# Patient Record
Sex: MALE | Race: WHITE | NOT HISPANIC OR LATINO | Employment: OTHER | URBAN - METROPOLITAN AREA
[De-identification: names, ages, dates, MRNs, and addresses within clinical notes are randomized per-mention and may not be internally consistent; named-entity substitution may affect disease eponyms.]

---

## 2024-09-04 ENCOUNTER — OFFICE VISIT (OUTPATIENT)
Dept: PODIATRY | Facility: CLINIC | Age: 84
End: 2024-09-04

## 2024-09-04 VITALS
BODY MASS INDEX: 29.26 KG/M2 | WEIGHT: 159 LBS | HEIGHT: 62 IN | SYSTOLIC BLOOD PRESSURE: 130 MMHG | DIASTOLIC BLOOD PRESSURE: 72 MMHG

## 2024-09-04 DIAGNOSIS — B35.1 ONYCHOMYCOSIS: Primary | ICD-10-CM

## 2024-09-04 PROCEDURE — NCFTCARE PR NON-COVERED FOOT CARE: Performed by: PODIATRIST

## 2024-09-04 NOTE — PROGRESS NOTES
PATIENT:  Emily Gama  1940       ASSESSMENT:     1. Onychomycosis                  PLAN:  All mycotic toenails were reduced and debrided in length, width, and girth using a nail nipper and dremel.  Patient tolerated procedure(s) well without complications.  Instructed proper foot care.  RA in 10 weeks.      Subjective:     HPI  The patient presents with chief complaint of thick/ deformed toenails.  No acute pedal problems.    The following portions of the patient's history were reviewed and updated as appropriate: allergies, current medications, past family history, past medical history, past social history, past surgical history and problem list.  All pertinent labs and images were reviewed.      Past Medical History  Past Medical History:   Diagnosis Date    Arthritis     Last assessed: 10/9/13    Atrial fibrillation (HCC)     Cancer (HCC)     squamous cell of the face    Chronic gastric ulcer without hemorrhage and without perforation 04/20/2020    Chronic kidney disease     Colon polyps     Coronary artery disease     Gastroparesis     Glaucoma     Hyperlipidemia     Hypertension     Ischemic colitis (HCC) 11/2013    Macular degeneration     Pacemaker        Past Surgical History  Past Surgical History:   Procedure Laterality Date    BREAST BIOPSY      biopsy    CARDIAC ELECTROPHYSIOLOGY PROCEDURE N/A 05/17/2022    Procedure: Cardiac eps/aflutter ablation;  Surgeon: Carl Perez MD;  [facility];  Service: Cardiology    CARDIAC ELECTROPHYSIOLOGY PROCEDURE N/A 05/17/2022    Procedure: CARDIAC PACER IMPLANT;  Surgeon: Carl Perez MD;  [facility];  Service: Cardiology    CARDIAC ELECTROPHYSIOLOGY PROCEDURE Left 05/18/2022    Procedure: Cardiac lead revision;  Surgeon: Carl Perez MD;  [facility];  Service: Cardiology    COLONOSCOPY  06/13/2013    COLONOSCOPY      June 2013 sigmoid diverticulosis and internal  hemorrhoids.  Previous colonoscopy is with adenomatous polyps.    EGD  02/11/2013    EYE SURGERY Bilateral     cataract surgery    TONSILLECTOMY      UPPER GASTROINTESTINAL ENDOSCOPY      March 2021 hiatal hernia, gastric ulcer healed, minimal gastritis.  March 2020 irregular Z-line, hiatal hernia, gastric ulcer.  Biopsies negative for H pylori or malignancy.  Biopsies negative for Leonard's.  February 2013 esophagus normal, gastritis and duodenitis, biopsies negative for H pylori.    WISDOM TOOTH EXTRACTION          Allergies:  Patient has no known allergies.    Medications:  Current Outpatient Medications   Medication Sig Dispense Refill    Cholecalciferol (VITAMIN D) 2000 units CAPS Take by mouth 1 daily      Eliquis 2.5 MG TAKE ONE TABLET BY MOUTH TWICE A  tablet 3    enalapril (VASOTEC) 10 mg tablet TAKE 1 TABLET BY MOUTH TWO TIMES A  tablet 1    flecainide (TAMBOCOR) 50 mg tablet Take 1 tablet (50 mg total) by mouth 2 (two) times a day 180 tablet 3    latanoprost (XALATAN) 0.005 % ophthalmic solution 1 drop both eyes at bedtime      metoprolol succinate (TOPROL-XL) 25 mg 24 hr tablet Take 1 tablet (25 mg total) by mouth daily 90 tablet 3    Multiple Vitamins-Minerals (PRESERVISION AREDS 2) CAPS Take by mouth 2 (two) times a day      simvastatin (ZOCOR) 10 mg tablet TAKE 1 TABLET BY MOUTH AT BEDTIME 30 tablet 4     No current facility-administered medications for this visit.       Social History:  Social History     Socioeconomic History    Marital status:      Spouse name: None    Number of children: None    Years of education: None    Highest education level: None   Occupational History    None   Tobacco Use    Smoking status: Never    Smokeless tobacco: Never   Vaping Use    Vaping status: Never Used   Substance and Sexual Activity    Alcohol use: Not Currently     Comment: Social    Drug use: No    Sexual activity: Never   Other Topics Concern    None   Social History Narrative     Active advance directive    Exercise habits---walks and does occasional stepper at home    Living alone    Living situation: supportive and safe    Sees dentist occasionally     Social Determinants of Health     Financial Resource Strain: Low Risk  (12/12/2023)    Overall Financial Resource Strain (CARDIA)     Difficulty of Paying Living Expenses: Not hard at all   Food Insecurity: No Food Insecurity (5/1/2024)    Hunger Vital Sign     Worried About Running Out of Food in the Last Year: Never true     Ran Out of Food in the Last Year: Never true   Transportation Needs: No Transportation Needs (5/1/2024)    PRAPARE - Transportation     Lack of Transportation (Medical): No     Lack of Transportation (Non-Medical): No   Physical Activity: Insufficiently Active (2/10/2021)    Exercise Vital Sign     Days of Exercise per Week: 3 days     Minutes of Exercise per Session: 30 min   Stress: Stress Concern Present (2/10/2021)    [employer] - Occupational Stress Questionnaire     Feeling of Stress : To some extent   Social Connections: Not on file   Intimate Partner Violence: Not At Risk (2/10/2021)    Humiliation, Afraid, Rape, and Kick questionnaire     Fear of Current or Ex-Partner: No     Emotionally Abused: No     Physically Abused: No     Sexually Abused: No   Housing Stability: Unknown (5/1/2024)    Housing Stability Vital Sign     Unable to Pay for Housing in the Last Year: No     Number of Times Moved in the Last Year: Not on file     Homeless in the Last Year: No          Review of Systems   Constitutional:  Negative for chills and fever.   HENT:  Negative for sore throat.    Respiratory:  Negative for cough and shortness of breath.    Cardiovascular:  Negative for chest pain.   Gastrointestinal:  Negative for diarrhea, nausea and vomiting.   Neurological:  Negative for weakness and numbness.         Objective:      /72 (BP Location: Left arm, Patient Position: Sitting, Cuff Size:  "Adult)   Ht 5' 2\" (1.575 m) Comment: stated  Wt 72.1 kg (159 lb)   LMP  (LMP Unknown)   BMI 29.08 kg/m²          Physical Exam  Vitals reviewed.   Constitutional:       General: He is not in acute distress.     Appearance: Normal appearance. He is well-developed. He is not ill-appearing.   Cardiovascular:      Rate and Rhythm: Normal rate and regular rhythm.      Pulses: Normal pulses.   Pulmonary:      Effort: Pulmonary effort is normal. No respiratory distress.   Musculoskeletal:         General: Deformity present. No tenderness or signs of injury. Normal range of motion.      Comments: Mild bunion noted.   Skin:     General: Skin is warm.      Capillary Refill: Capillary refill takes less than 2 seconds.      Coloration: Skin is not cyanotic or mottled.      Findings: No ecchymosis, erythema, rash or wound. Rash is not purpuric.      Nails: There is no clubbing.      Comments: Hypertrophy and yellow discoloration of toenails X 6.     Neurological:      General: No focal deficit present.      Mental Status: He is alert and oriented to person, place, and time.      Cranial Nerves: No cranial nerve deficit.      Sensory: No sensory deficit.   Psychiatric:         Mood and Affect: Mood normal.         Behavior: Behavior normal.         "

## 2024-10-01 ENCOUNTER — OFFICE VISIT (OUTPATIENT)
Dept: GASTROENTEROLOGY | Facility: CLINIC | Age: 84
End: 2024-10-01
Payer: COMMERCIAL

## 2024-10-01 VITALS
DIASTOLIC BLOOD PRESSURE: 78 MMHG | SYSTOLIC BLOOD PRESSURE: 120 MMHG | WEIGHT: 158 LBS | HEIGHT: 62 IN | BODY MASS INDEX: 29.08 KG/M2

## 2024-10-01 DIAGNOSIS — R13.10 DYSPHAGIA, UNSPECIFIED TYPE: ICD-10-CM

## 2024-10-01 DIAGNOSIS — K59.00 CONSTIPATION, UNSPECIFIED CONSTIPATION TYPE: ICD-10-CM

## 2024-10-01 DIAGNOSIS — K21.9 GASTROESOPHAGEAL REFLUX DISEASE WITHOUT ESOPHAGITIS: Primary | ICD-10-CM

## 2024-10-01 PROCEDURE — 99213 OFFICE O/P EST LOW 20 MIN: CPT | Performed by: INTERNAL MEDICINE

## 2024-10-01 NOTE — LETTER
October 1, 2024     Shasha Villatoro DO  [address] [address]    Patient: Emily Gama   YOB: 1940   Date of Visit: 10/1/2024       Dear Dr. Villatoro:    Thank you for referring Emily Gama to me for evaluation. Below are my notes for this consultation.    If you have questions, please do not hesitate to call me. I look forward to following your patient along with you.         Sincerely,        Makayla Camarena MD        CC: No Recipients    Makayla Camarena MD  10/1/2024 12:37 PM  Sign when Signing Visit  [facility] - Outpatient Follow-up Note  Emily Gama 83 y.o. adult MRN: [id number]  Encounter: [id number]    ASSESSMENT AND PLAN:      1. Gastroesophageal reflux disease without esophagitis  2. Dysphagia, unspecified type  GERD clinically stable with dietary management.  No alarm symptoms.  No dysphagia.  No acid suppression requirements.  Continue renal/reflux diet and precautions  OTC antacids if needed    3. Constipation, unspecified constipation type  Dietary management has also been effective for chronic constipation.  No constipation diarrhea melena or hematochezia.  Trying to eat more fruits and vegetables via her recent dietary changes due to renal disease.  Continue to encourage dietary fiber      Followup Appointment: 1 year or sooner if needed  ______________________________________________________________________    Chief Complaint   Patient presents with   • Follow-up     1 year     HPI:  GERD stable.  No dysphagia.  Still able to control by dietary management.  No nighttime meals.  Has not needed famotidine, occasional TUMS.  Tolerates camomile tea at night.    Stools generally good.  Occasional constipation.  Not needing Miralax.  Cut out fiber supplements also.  Eating more dietary fiber and avoiding meats.      Historical Information  Past Medical History:   Diagnosis Date   • Arthritis     Last assessed: 10/9/13   • Atrial  fibrillation (HCC)    • Cancer (HCC)     squamous cell of the face   • Chronic gastric ulcer without hemorrhage and without perforation 04/20/2020   • Chronic kidney disease    • Colon polyps    • Coronary artery disease    • Gastroparesis    • Glaucoma    • Hyperlipidemia    • Hypertension    • Ischemic colitis (HCC) 11/2013   • Macular degeneration    • Pacemaker      Past Surgical History:   Procedure Laterality Date   • BREAST BIOPSY      biopsy   • CARDIAC ELECTROPHYSIOLOGY PROCEDURE N/A 05/17/2022    Procedure: Cardiac eps/aflutter ablation;  Surgeon: Carl Perez MD;  [facility];  Service: Cardiology   • CARDIAC ELECTROPHYSIOLOGY PROCEDURE N/A 05/17/2022    Procedure: CARDIAC PACER IMPLANT;  Surgeon: Carl Perez MD;  [facility];  Service: Cardiology   • CARDIAC ELECTROPHYSIOLOGY PROCEDURE Left 05/18/2022    Procedure: Cardiac lead revision;  Surgeon: Carl Perez MD;  [facility];  Service: Cardiology   • COLONOSCOPY  06/13/2013   • COLONOSCOPY      June 2013 sigmoid diverticulosis and internal hemorrhoids.  Previous colonoscopy is with adenomatous polyps.   • EGD  02/11/2013   • EYE SURGERY Bilateral     cataract surgery   • TONSILLECTOMY     • UPPER GASTROINTESTINAL ENDOSCOPY      March 2021 hiatal hernia, gastric ulcer healed, minimal gastritis.  March 2020 irregular Z-line, hiatal hernia, gastric ulcer.  Biopsies negative for H pylori or malignancy.  Biopsies negative for Leonard's.  February 2013 esophagus normal, gastritis and duodenitis, biopsies negative for H pylori.   • WISDOM TOOTH EXTRACTION       Social History     Substance and Sexual Activity   Alcohol Use Not Currently    Comment: Social     Social History     Substance and Sexual Activity   Drug Use No     Social History     Tobacco Use   Smoking Status Never   Smokeless Tobacco Never     Family History   Problem Relation Age of Onset   • Hypertension Mother    • Stroke  "Mother    • Kidney disease Father    • Hypertension Father    • Cancer Father    • Stomach cancer Father    • Dialysis Father    • Single kidney Father    • Substance Abuse Neg Hx    • Mental illness Neg Hx    • Colon polyps Neg Hx    • Colon cancer Neg Hx          Current Outpatient Medications:   •  Cholecalciferol (VITAMIN D) 2000 units CAPS  •  Eliquis 2.5 MG  •  enalapril (VASOTEC) 10 mg tablet  •  flecainide (TAMBOCOR) 50 mg tablet  •  latanoprost (XALATAN) 0.005 % ophthalmic solution  •  metoprolol succinate (TOPROL-XL) 25 mg 24 hr tablet  •  Multiple Vitamins-Minerals (PRESERVISION AREDS 2) CAPS  •  simvastatin (ZOCOR) 10 mg tablet  No Known Allergies  Reviewed medications and allergies and updated as indicated    PHYSICAL EXAM:    Blood pressure 120/78, height 5' 2\" (1.575 m), weight 71.7 kg (158 lb). Body mass index is 28.9 kg/m².  General Appearance: NAD, cooperative, alert  Eyes: Anicteric, conjunctiva pink  ENT:  Normocephalic, atraumatic, normal mucosa.    Neck:  Supple, symmetrical, trachea midline  Resp:  Clear to auscultation bilaterally; no rales, rhonchi or wheezing; respirations unlabored   CV:  S1 S2, Regular rate and rhythm; no murmur, rub, or gallop.  GI:  Soft, non-tender, non-distended; normal bowel sounds; no masses, no organomegaly   Rectal: Deferred  Musculoskeletal: No cyanosis, clubbing or edema. Normal ROM.  Skin:  No jaundice, rashes, or lesions   Heme/Lymph: No palpable cervical lymphadenopathy  Psych: Normal affect, good eye contact  Neuro: No gross deficits, AAOx3    Lab Results:   Lab Results   Component Value Date    WBC 5.11 04/18/2024    HGB 9.2 (L) 04/18/2024    HCT 30.1 (L) 04/18/2024    MCV 92 04/18/2024     04/18/2024     Lab Results   Component Value Date     11/30/2015    K 4.9 04/24/2024     (H) 04/24/2024    CO2 26 04/24/2024    ANIONGAP 8 11/30/2015    BUN 26 (H) 04/24/2024    CREATININE 1.62 (H) 04/24/2024    GLUCOSE 86 11/30/2015    GLUF 85 " 04/18/2024    CALCIUM 9.0 04/24/2024    CORRECTEDCA 10.0 07/29/2021    AST 19 12/08/2023    ALT 12 12/08/2023    ALKPHOS 115 (H) 12/08/2023    PROT 7.8 10/14/2015    BILITOT 0.45 10/14/2015    EGFR 28 04/24/2024       Radiology Results:   No results found.

## 2024-10-01 NOTE — PROGRESS NOTES
[facility] - Outpatient Follow-up Note  Emily Gama 83 y.o. adult MRN: [id number]  Encounter: [id number]    ASSESSMENT AND PLAN:      1. Gastroesophageal reflux disease without esophagitis  2. Dysphagia, unspecified type  GERD clinically stable with dietary management.  No alarm symptoms.  No dysphagia.  No acid suppression requirements.  Continue renal/reflux diet and precautions  OTC antacids if needed    3. Constipation, unspecified constipation type  Dietary management has also been effective for chronic constipation.  No constipation diarrhea melena or hematochezia.  Trying to eat more fruits and vegetables via her recent dietary changes due to renal disease.  Continue to encourage dietary fiber      Followup Appointment: 1 year or sooner if needed  ______________________________________________________________________    Chief Complaint   Patient presents with    Follow-up     1 year     HPI:  GERD stable.  No dysphagia.  Still able to control by dietary management.  No nighttime meals.  Has not needed famotidine, occasional TUMS.  Tolerates camomile tea at night.    Stools generally good.  Occasional constipation.  Not needing Miralax.  Cut out fiber supplements also.  Eating more dietary fiber and avoiding meats.      Historical Information   Past Medical History:   Diagnosis Date    Arthritis     Last assessed: 10/9/13    Atrial fibrillation (HCC)     Cancer (HCC)     squamous cell of the face    Chronic gastric ulcer without hemorrhage and without perforation 04/20/2020    Chronic kidney disease     Colon polyps     Coronary artery disease     Gastroparesis     Glaucoma     Hyperlipidemia     Hypertension     Ischemic colitis (HCC) 11/2013    Macular degeneration     Pacemaker      Past Surgical History:   Procedure Laterality Date    BREAST BIOPSY      biopsy    CARDIAC ELECTROPHYSIOLOGY PROCEDURE N/A 05/17/2022    Procedure: Cardiac eps/aflutter ablation;  Surgeon:  Carl Perez MD;  [facility];  Service: Cardiology    CARDIAC ELECTROPHYSIOLOGY PROCEDURE N/A 05/17/2022    Procedure: CARDIAC PACER IMPLANT;  Surgeon: Carl Perez MD;  [facility];  Service: Cardiology    CARDIAC ELECTROPHYSIOLOGY PROCEDURE Left 05/18/2022    Procedure: Cardiac lead revision;  Surgeon: Carl Perez MD;  [facility];  Service: Cardiology    COLONOSCOPY  06/13/2013    COLONOSCOPY      June 2013 sigmoid diverticulosis and internal hemorrhoids.  Previous colonoscopy is with adenomatous polyps.    EGD  02/11/2013    EYE SURGERY Bilateral     cataract surgery    TONSILLECTOMY      UPPER GASTROINTESTINAL ENDOSCOPY      March 2021 hiatal hernia, gastric ulcer healed, minimal gastritis.  March 2020 irregular Z-line, hiatal hernia, gastric ulcer.  Biopsies negative for H pylori or malignancy.  Biopsies negative for Leonard's.  February 2013 esophagus normal, gastritis and duodenitis, biopsies negative for H pylori.    WISDOM TOOTH EXTRACTION       Social History     Substance and Sexual Activity   Alcohol Use Not Currently    Comment: Social     Social History     Substance and Sexual Activity   Drug Use No     Social History     Tobacco Use   Smoking Status Never   Smokeless Tobacco Never     Family History   Problem Relation Age of Onset    Hypertension Mother     Stroke Mother     Kidney disease Father     Hypertension Father     Cancer Father     Stomach cancer Father     Dialysis Father     Single kidney Father     Substance Abuse Neg Hx     Mental illness Neg Hx     Colon polyps Neg Hx     Colon cancer Neg Hx          Current Outpatient Medications:     Cholecalciferol (VITAMIN D) 2000 units CAPS    Eliquis 2.5 MG    enalapril (VASOTEC) 10 mg tablet    flecainide (TAMBOCOR) 50 mg tablet    latanoprost (XALATAN) 0.005 % ophthalmic solution    metoprolol succinate (TOPROL-XL) 25 mg 24 hr tablet    Multiple Vitamins-Minerals  "(PRESERVISION AREDS 2) CAPS    simvastatin (ZOCOR) 10 mg tablet  No Known Allergies  Reviewed medications and allergies and updated as indicated    PHYSICAL EXAM:    Blood pressure 120/78, height 5' 2\" (1.575 m), weight 71.7 kg (158 lb). Body mass index is 28.9 kg/m².  General Appearance: NAD, cooperative, alert  Eyes: Anicteric, conjunctiva pink  ENT:  Normocephalic, atraumatic, normal mucosa.    Neck:  Supple, symmetrical, trachea midline  Resp:  Clear to auscultation bilaterally; no rales, rhonchi or wheezing; respirations unlabored   CV:  S1 S2, Regular rate and rhythm; no murmur, rub, or gallop.  GI:  Soft, non-tender, non-distended; normal bowel sounds; no masses, no organomegaly   Rectal: Deferred  Musculoskeletal: No cyanosis, clubbing or edema. Normal ROM.  Skin:  No jaundice, rashes, or lesions   Heme/Lymph: No palpable cervical lymphadenopathy  Psych: Normal affect, good eye contact  Neuro: No gross deficits, AAOx3    Lab Results:   Lab Results   Component Value Date    WBC 5.11 04/18/2024    HGB 9.2 (L) 04/18/2024    HCT 30.1 (L) 04/18/2024    MCV 92 04/18/2024     04/18/2024     Lab Results   Component Value Date     11/30/2015    K 4.9 04/24/2024     (H) 04/24/2024    CO2 26 04/24/2024    ANIONGAP 8 11/30/2015    BUN 26 (H) 04/24/2024    CREATININE 1.62 (H) 04/24/2024    GLUCOSE 86 11/30/2015    GLUF 85 04/18/2024    CALCIUM 9.0 04/24/2024    CORRECTEDCA 10.0 07/29/2021    AST 19 12/08/2023    ALT 12 12/08/2023    ALKPHOS 115 (H) 12/08/2023    PROT 7.8 10/14/2015    BILITOT 0.45 10/14/2015    EGFR 28 04/24/2024       Radiology Results:   No results found.    "

## 2024-10-16 ENCOUNTER — REMOTE DEVICE CLINIC VISIT (OUTPATIENT)
Dept: CARDIOLOGY CLINIC | Facility: CLINIC | Age: 84
End: 2024-10-16
Payer: COMMERCIAL

## 2024-10-16 DIAGNOSIS — Z95.0 PRESENCE OF CARDIAC PACEMAKER: Primary | ICD-10-CM

## 2024-10-16 PROCEDURE — 93296 REM INTERROG EVL PM/IDS: CPT | Performed by: INTERNAL MEDICINE

## 2024-10-16 PROCEDURE — 93294 REM INTERROG EVL PM/LDLS PM: CPT | Performed by: INTERNAL MEDICINE

## 2024-10-16 NOTE — PROGRESS NOTES
Results for orders placed or performed in visit on 10/16/24   Cardiac EP device report    Narrative    MDT DC PM / ACTIVE SYSTEM IS MRI CONDITIONAL  CARELINK TRANSMISSION: BATTERY VOLTAGE ADEQUATE (11 YR). AP 99.7%  <0.1% (AAIR-DDDR 70 PPM). ALL AVAILABLE LEAD PARAMETERS WITHIN NORMAL LIMITS. NO SIGNIFICANT HIGH RATE EPISODES. NORMAL DEVICE FUNCTION.  ES

## 2024-10-26 DIAGNOSIS — I48.3 TYPICAL ATRIAL FLUTTER (HCC): ICD-10-CM

## 2024-10-28 ENCOUNTER — APPOINTMENT (OUTPATIENT)
Dept: LAB | Facility: HOSPITAL | Age: 84
End: 2024-10-28
Payer: COMMERCIAL

## 2024-10-28 DIAGNOSIS — D64.9 ANEMIA, UNSPECIFIED TYPE: ICD-10-CM

## 2024-10-28 DIAGNOSIS — N18.4 CHRONIC RENAL DISEASE, STAGE IV (HCC): ICD-10-CM

## 2024-10-28 LAB
ANION GAP SERPL CALCULATED.3IONS-SCNC: 7 MMOL/L (ref 4–13)
BACTERIA UR QL AUTO: ABNORMAL /HPF
BASOPHILS # BLD AUTO: 0.04 THOUSANDS/ΜL (ref 0–0.1)
BASOPHILS NFR BLD AUTO: 1 % (ref 0–1)
BILIRUB UR QL STRIP: NEGATIVE
BUN SERPL-MCNC: 31 MG/DL (ref 5–25)
CALCIUM SERPL-MCNC: 9.5 MG/DL (ref 8.4–10.2)
CHLORIDE SERPL-SCNC: 110 MMOL/L (ref 96–108)
CLARITY UR: CLEAR
CO2 SERPL-SCNC: 25 MMOL/L (ref 21–32)
COLOR UR: ABNORMAL
CREAT SERPL-MCNC: 1.56 MG/DL (ref 0.6–1.3)
CREAT UR-MCNC: 95.8 MG/DL
CREAT UR-MCNC: 95.8 MG/DL
EOSINOPHIL # BLD AUTO: 0.31 THOUSAND/ΜL (ref 0–0.61)
EOSINOPHIL NFR BLD AUTO: 5 % (ref 0–6)
ERYTHROCYTE [DISTWIDTH] IN BLOOD BY AUTOMATED COUNT: 14.3 % (ref 11.6–15.1)
FERRITIN SERPL-MCNC: 34 NG/ML (ref 24–307)
GLUCOSE P FAST SERPL-MCNC: 88 MG/DL (ref 65–99)
GLUCOSE UR STRIP-MCNC: NEGATIVE MG/DL
HCT VFR BLD AUTO: 33 % (ref 36.5–46.1)
HGB BLD-MCNC: 10.6 G/DL (ref 12–15.4)
HGB UR QL STRIP.AUTO: NEGATIVE
IMM GRANULOCYTES # BLD AUTO: 0.02 THOUSAND/UL (ref 0–0.2)
IMM GRANULOCYTES NFR BLD AUTO: 0 % (ref 0–2)
IRON SATN MFR SERPL: 31 % (ref 15–50)
IRON SERPL-MCNC: 102 UG/DL (ref 50–212)
KETONES UR STRIP-MCNC: NEGATIVE MG/DL
LEUKOCYTE ESTERASE UR QL STRIP: ABNORMAL
LYMPHOCYTES # BLD AUTO: 1.82 THOUSANDS/ΜL (ref 0.6–4.47)
LYMPHOCYTES NFR BLD AUTO: 26 % (ref 14–44)
MCH RBC QN AUTO: 30.3 PG (ref 26.8–34.3)
MCHC RBC AUTO-ENTMCNC: 32.1 G/DL (ref 31.4–37.4)
MCV RBC AUTO: 94 FL (ref 82–98)
MICROALBUMIN UR-MCNC: 36.4 MG/L
MICROALBUMIN/CREAT 24H UR: 38 MG/G CREATININE (ref 0–30)
MONOCYTES # BLD AUTO: 0.44 THOUSAND/ΜL (ref 0.17–1.22)
MONOCYTES NFR BLD AUTO: 6 % (ref 4–12)
NEUTROPHILS # BLD AUTO: 4.27 THOUSANDS/ΜL (ref 1.85–7.62)
NEUTS SEG NFR BLD AUTO: 62 % (ref 43–75)
NITRITE UR QL STRIP: NEGATIVE
NON-SQ EPI CELLS URNS QL MICRO: ABNORMAL /HPF
NRBC BLD AUTO-RTO: 0 /100 WBCS
PH UR STRIP.AUTO: 5.5 [PH]
PLATELET # BLD AUTO: 192 THOUSANDS/UL (ref 149–390)
PMV BLD AUTO: 11.2 FL (ref 8.9–12.7)
POTASSIUM SERPL-SCNC: 5 MMOL/L (ref 3.5–5.3)
PROT UR STRIP-MCNC: ABNORMAL MG/DL
PROT UR-MCNC: 14.6 MG/DL
PROT/CREAT UR: 0.2 MG/G{CREAT} (ref 0–0.1)
RBC # BLD AUTO: 3.5 MILLION/UL (ref 3.88–5.12)
RBC #/AREA URNS AUTO: ABNORMAL /HPF
SODIUM SERPL-SCNC: 142 MMOL/L (ref 135–147)
SP GR UR STRIP.AUTO: 1.01 (ref 1–1.03)
TIBC SERPL-MCNC: 325 UG/DL (ref 250–450)
UIBC SERPL-MCNC: 223 UG/DL (ref 155–355)
UROBILINOGEN UR STRIP-ACNC: <2 MG/DL
WBC # BLD AUTO: 6.9 THOUSAND/UL (ref 4.31–10.16)
WBC #/AREA URNS AUTO: ABNORMAL /HPF

## 2024-10-28 PROCEDURE — 83540 ASSAY OF IRON: CPT

## 2024-10-28 PROCEDURE — 85025 COMPLETE CBC W/AUTO DIFF WBC: CPT

## 2024-10-28 PROCEDURE — 84156 ASSAY OF PROTEIN URINE: CPT

## 2024-10-28 PROCEDURE — 82043 UR ALBUMIN QUANTITATIVE: CPT

## 2024-10-28 PROCEDURE — 82570 ASSAY OF URINE CREATININE: CPT

## 2024-10-28 PROCEDURE — 36415 COLL VENOUS BLD VENIPUNCTURE: CPT

## 2024-10-28 PROCEDURE — 83550 IRON BINDING TEST: CPT

## 2024-10-28 PROCEDURE — 82728 ASSAY OF FERRITIN: CPT

## 2024-10-28 PROCEDURE — 80048 BASIC METABOLIC PNL TOTAL CA: CPT

## 2024-10-28 PROCEDURE — 81001 URINALYSIS AUTO W/SCOPE: CPT

## 2024-10-28 NOTE — TELEPHONE ENCOUNTER
Requested medication(s) are due for refill today: Yes  Patient has already received a courtesy refill: No  Other reason request has been forwarded to provider: Tho f/u in [place]

## 2024-10-29 RX ORDER — APIXABAN 2.5 MG/1
2.5 TABLET, FILM COATED ORAL 2 TIMES DAILY
Qty: 180 TABLET | Refills: 2 | Status: SHIPPED | OUTPATIENT
Start: 2024-10-29

## 2024-11-05 ENCOUNTER — OFFICE VISIT (OUTPATIENT)
Dept: NEPHROLOGY | Facility: HOSPITAL | Age: 84
End: 2024-11-05
Payer: COMMERCIAL

## 2024-11-05 VITALS
SYSTOLIC BLOOD PRESSURE: 132 MMHG | DIASTOLIC BLOOD PRESSURE: 80 MMHG | HEIGHT: 62 IN | BODY MASS INDEX: 29.44 KG/M2 | WEIGHT: 160 LBS

## 2024-11-05 DIAGNOSIS — N18.4 ANEMIA DUE TO STAGE 4 CHRONIC KIDNEY DISEASE  (HCC): ICD-10-CM

## 2024-11-05 DIAGNOSIS — N18.4 CHRONIC RENAL DISEASE, STAGE IV (HCC): Primary | ICD-10-CM

## 2024-11-05 DIAGNOSIS — E55.9 VITAMIN D DEFICIENCY: ICD-10-CM

## 2024-11-05 DIAGNOSIS — I10 PRIMARY HYPERTENSION: ICD-10-CM

## 2024-11-05 DIAGNOSIS — D63.1 ANEMIA DUE TO STAGE 4 CHRONIC KIDNEY DISEASE  (HCC): ICD-10-CM

## 2024-11-05 DIAGNOSIS — N20.0 NEPHROLITHIASIS: ICD-10-CM

## 2024-11-05 PROCEDURE — 99214 OFFICE O/P EST MOD 30 MIN: CPT | Performed by: INTERNAL MEDICINE

## 2024-11-05 NOTE — ASSESSMENT & PLAN NOTE
-blood pressure acceptable in light of patient's age and history of CKD  -avoid high sodium/salt diet  -avoid caffeine  -continue enalapril 10mg twice daily, flecainide 50mg twice daily, metoprolol 25mg daily  -goal BP < 150/90, at goal

## 2024-11-05 NOTE — ASSESSMENT & PLAN NOTE
- noted on renal u/s performed March 2023  -recommend increased hydration to urinate 2-2.5L/day  -avoid high salt/sodium diet

## 2024-11-05 NOTE — ASSESSMENT & PLAN NOTE
-Chronic kidney disease stage IV in the setting of age-related nephron loss plus or minus hypertensive nephrosclerosis  - Baseline serum creatinine 1.3-1.4 up until early 2015, baseline has risen into the mid to high ones with most recent baseline 1.6-1.8, last serum creatinine 1.56 as of 10/28/24.  This correlates with an EGFR of 25-35 L/min. Renal function remains stable.  -Renal u/s March 2023 showed b/l cortical thinning and b/l renal cysts  -last UA with trace protein, microalb:Cr 38mg/g, UpCr 0.2g(improving)  -would avoid excessive vitamin C intake as this can lead to oxalate deposition in the setting of CKD. Avoid > 200mg vitamin C/day.  -avoid nonsteroidals including ibuprofen, aleve, advil, motrin, naproxen, celebrex, indomethacin, toradol  -avoid herbal teas containing dandelion root or licorice root or rosehips  -CKD education booklet provided in office previously  -would not want dialysis if needed due to family experiences  -low risk of kidney failure at 1 years and moderate risk of kidney failure at 4 years

## 2024-11-05 NOTE — ASSESSMENT & PLAN NOTE
- Hgb 10.6 as of 10/28/24, low but improved  -prior iron panel showed low ferritin and low iron sat in Aug. 2020, iron 55, TIBC 315.   -Monitor CBC.  -Repeat iron panel acceptable as of 10/28/24  -r/o myeloma(SPEP/UPEP/FLC)

## 2024-11-05 NOTE — PROGRESS NOTES
NEPHROLOGY OUTPATIENT PROGRESS NOTE   Emily Gama 83 y.o. adult MRN: [id number]  DATE: 11/5/2024  Reason for visit:   Chief Complaint   Patient presents with    Chronic Kidney Disease    Follow-up        Patient Instructions   Chronic renal disease, stage IV (HCC)  -Chronic kidney disease stage IV in the setting of age-related nephron loss plus or minus hypertensive nephrosclerosis  - Baseline serum creatinine 1.3-1.4 up until early 2015, baseline has risen into the mid to high ones with most recent baseline 1.6-1.8, last serum creatinine 1.56 as of 10/28/24.  This correlates with an EGFR of 25-35 L/min. Renal function remains stable.  -Renal u/s March 2023 showed b/l cortical thinning and b/l renal cysts  -last UA with trace protein, microalb:Cr 38mg/g, UpCr 0.2g(improving)  -would avoid excessive vitamin C intake as this can lead to oxalate deposition in the setting of CKD. Avoid > 200mg vitamin C/day.  -avoid nonsteroidals including ibuprofen, aleve, advil, motrin, naproxen, celebrex, indomethacin, toradol  -avoid herbal teas containing dandelion root or licorice root or rosehips  -CKD education booklet provided in office previously  -would not want dialysis if needed due to family experiences  -low risk of kidney failure at 2 years and moderate risk of kidney failure at 5 years  Primary hypertension  -blood pressure acceptable in light of patient's age and history of CKD  -avoid high sodium/salt diet  -avoid caffeine  -continue enalapril 10mg twice daily, flecainide 50mg twice daily, metoprolol 25mg daily  -goal BP < 150/90, at goal  Vitamin D deficiency  - continue 2000u daily vitamin D, last level 29.6 as of Dec. 2023, slightly low  Anemia due to stage 4 chronic kidney disease  (HCC)  - Hgb 10.6 as of 10/28/24, low but improved  -prior iron panel showed low ferritin and low iron sat in Aug. 2020, iron 55, TIBC 315.   -Monitor CBC.  -Repeat iron panel acceptable as of 10/28/24  -r/o myeloma(SPEP/UPEP/FLC)      Nephrolithiasis   - noted on renal u/s performed March 2023  -recommend increased hydration to urinate 2-2.5L/day  -avoid high salt/sodium diet     RTC in 6 months. Obtain blood, urine testing prior to next office visit in late Jan. 2025.      Emily was seen today for chronic kidney disease and follow-up.    Diagnoses and all orders for this visit:    Chronic renal disease, stage IV (HCC)  -     Basic metabolic panel; Future  -     Urinalysis with microscopic; Future  -     Protein / creatinine ratio, urine; Future  -     Albumin / creatinine urine ratio; Future  -     Protein electrophoresis, serum; Future  -     Protein electrophoresis, urine; Future  -     Immunoglobulin free LT chains blood; Future    Primary hypertension    Vitamin D deficiency    Anemia due to stage 4 chronic kidney disease  (HCC)  -     Protein / creatinine ratio, urine; Future  -     Albumin / creatinine urine ratio; Future  -     Protein electrophoresis, serum; Future  -     Protein electrophoresis, urine; Future  -     Immunoglobulin free LT chains blood; Future    Nephrolithiasis          Assessment & Plan  Chronic renal disease, stage IV (HCC)  -Chronic kidney disease stage IV in the setting of age-related nephron loss plus or minus hypertensive nephrosclerosis  - Baseline serum creatinine 1.3-1.4 up until early 2015, baseline has risen into the mid to high ones with most recent baseline 1.6-1.8, last serum creatinine 1.56 as of 10/28/24.  This correlates with an EGFR of 25-35 L/min. Renal function remains stable.  -Renal u/s March 2023 showed b/l cortical thinning and b/l renal cysts  -last UA with trace protein, microalb:Cr 38mg/g, UpCr 0.2g(improving)  -would avoid excessive vitamin C intake as this can lead to oxalate deposition in the setting of CKD. Avoid > 200mg vitamin C/day.  -avoid nonsteroidals including ibuprofen, aleve, advil, motrin, naproxen, celebrex, indomethacin, toradol  -avoid herbal teas containing dandelion root  or licorice root or rosehips  -CKD education booklet provided in office previously  -would not want dialysis if needed due to family experiences  -low risk of kidney failure at 2 years and moderate risk of kidney failure at 5 years  Primary hypertension  -blood pressure acceptable in light of patient's age and history of CKD  -avoid high sodium/salt diet  -avoid caffeine  -continue enalapril 10mg twice daily, flecainide 50mg twice daily, metoprolol 25mg daily  -goal BP < 150/90, at goal  Vitamin D deficiency  - continue 2000u daily vitamin D, last level 29.6 as of Dec. 2023, slightly low  Anemia due to stage 4 chronic kidney disease  (HCC)  - Hgb 10.6 as of 10/28/24, low but improved  -prior iron panel showed low ferritin and low iron sat in Aug. 2020, iron 55, TIBC 315.   -Monitor CBC.  -Repeat iron panel acceptable as of 10/28/24  -r/o myeloma(SPEP/UPEP/FLC)     Nephrolithiasis   - noted on renal u/s performed March 2023  -recommend increased hydration to urinate 2-2.5L/day  -avoid high salt/sodium diet     RTC in 6 months. Obtain blood, urine testing prior to next office visit in late Jan. 2025.    SUBJECTIVE / INTERVAL HISTORY:  83 y.o. adult presents in follow up of CKD.     Emily ALEXANDER Erenmichelle denies any recent illness/hospitalizations/medication changes since last office visit. Did go to the ER for inability to swallow.     Denies NSAID use.  HTN - BP at home has been labile.   [place] recent kidney stones.    Review of Systems   Constitutional:  Negative for chills and fever.   HENT:  Negative for sore throat.    Eyes:  Negative for visual disturbance.   Respiratory:  Negative for cough and shortness of breath.    Cardiovascular:  Negative for chest pain and leg swelling.   Gastrointestinal:  Negative for abdominal pain, constipation, diarrhea, nausea and vomiting.   Endocrine: Negative for polyuria.   Genitourinary:  Negative for decreased urine volume, difficulty urinating, dysuria and hematuria.  "  Musculoskeletal:  Negative for back pain and myalgias.   Skin:  Negative for rash.   Neurological:  Positive for weakness. Negative for dizziness, light-headedness and numbness.   Psychiatric/Behavioral:  Negative for confusion.        OBJECTIVE:  /80 (BP Location: Left arm, Patient Position: Sitting, Cuff Size: Standard)   Ht 5' 2\" (1.575 m)   Wt 72.6 kg (160 lb)   LMP  (LMP Unknown)   BMI 29.26 kg/m²  Body mass index is 29.26 kg/m².    Physical exam:  Physical Exam  Vitals reviewed.   Constitutional:       General: He is not in acute distress.     Appearance: Normal appearance. He is well-developed. He is not diaphoretic.   HENT:      Head: Normocephalic and atraumatic.      Nose: Nose normal.      Mouth/Throat:      Mouth: Mucous membranes are dry.      Pharynx: No oropharyngeal exudate.   Eyes:      General: No scleral icterus.        Right eye: No discharge.         Left eye: No discharge.   Neck:      Thyroid: No thyromegaly.   Cardiovascular:      Rate and Rhythm: Normal rate and regular rhythm.      Heart sounds: Normal heart sounds.   Pulmonary:      Effort: Pulmonary effort is normal.      Breath sounds: Normal breath sounds. No wheezing or rales.   Abdominal:      General: Bowel sounds are normal. There is no distension.      Palpations: Abdomen is soft.      Tenderness: There is no abdominal tenderness.   Musculoskeletal:         General: No swelling. Normal range of motion.      Cervical back: Neck supple.   Lymphadenopathy:      Cervical: No cervical adenopathy.   Skin:     General: Skin is warm and dry.      Coloration: Skin is not jaundiced.      Findings: No rash.   Neurological:      General: No focal deficit present.      Mental Status: He is alert.      Comments: awake   Psychiatric:         Mood and Affect: Mood normal.         Behavior: Behavior normal.         Medications:    Current Outpatient Medications:     Cholecalciferol (VITAMIN D) 2000 units CAPS, Take by mouth 1 daily, " "Disp: , Rfl:     Eliquis 2.5 MG, TAKE ONE TABLET BY MOUTH TWICE A DAY, Disp: 180 tablet, Rfl: 2    enalapril (VASOTEC) 10 mg tablet, TAKE 1 TABLET BY MOUTH TWO TIMES A DAY, Disp: 180 tablet, Rfl: 1    flecainide (TAMBOCOR) 50 mg tablet, Take 1 tablet (50 mg total) by mouth 2 (two) times a day, Disp: 180 tablet, Rfl: 3    latanoprost (XALATAN) 0.005 % ophthalmic solution, 1 drop both eyes at bedtime, Disp: , Rfl:     metoprolol succinate (TOPROL-XL) 25 mg 24 hr tablet, Take 1 tablet (25 mg total) by mouth daily, Disp: 90 tablet, Rfl: 3    simvastatin (ZOCOR) 10 mg tablet, TAKE 1 TABLET BY MOUTH AT BEDTIME, Disp: 30 tablet, Rfl: 4    Multiple Vitamins-Minerals (PRESERVISION AREDS 2) CAPS, Take by mouth 2 (two) times a day (Patient not taking: Reported on 11/5/2024), Disp: , Rfl:     Allergies:  Allergies as of 11/05/2024    (No Known Allergies)       The following portions of the patient's history were reviewed and updated as appropriate: past family history, past surgical history and problem list.    Laboratory Results:  Lab Results   Component Value Date    SODIUM 142 10/28/2024    K 5.0 10/28/2024     (H) 10/28/2024    CO2 25 10/28/2024    BUN 31 (H) 10/28/2024    CREATININE 1.56 (H) 10/28/2024    GLUC 84 04/24/2024    CALCIUM 9.5 10/28/2024        Lab Results   Component Value Date    CALCIUM 9.5 10/28/2024       Portions of the record may have been created with voice recognition software.  Occasional wrong word or \"sound a like\" substitutions may have occurred due to the inherent limitations of voice recognition software.  Read the chart carefully and recognize, using context, where substitutions have occurred.  "

## 2024-11-05 NOTE — PATIENT INSTRUCTIONS
Chronic renal disease, stage IV (HCC)  -Chronic kidney disease stage IV in the setting of age-related nephron loss plus or minus hypertensive nephrosclerosis  - Baseline serum creatinine 1.3-1.4 up until early 2015, baseline has risen into the mid to high ones with most recent baseline 1.6-1.8, last serum creatinine 1.56 as of 10/28/24.  This correlates with an EGFR of 25-35 L/min. Renal function remains stable.  -Renal u/s March 2023 showed b/l cortical thinning and b/l renal cysts  -last UA with trace protein, microalb:Cr 38mg/g, UpCr 0.2g(improving)  -would avoid excessive vitamin C intake as this can lead to oxalate deposition in the setting of CKD. Avoid > 200mg vitamin C/day.  -avoid nonsteroidals including ibuprofen, aleve, advil, motrin, naproxen, celebrex, indomethacin, toradol  -avoid herbal teas containing dandelion root or licorice root or rosehips  -CKD education booklet provided in office previously  -would not want dialysis if needed due to family experiences  -low risk of kidney failure at 2 years and moderate risk of kidney failure at 5 years  Primary hypertension  -blood pressure acceptable in light of patient's age and history of CKD  -avoid high sodium/salt diet  -avoid caffeine  -continue enalapril 10mg twice daily, flecainide 50mg twice daily, metoprolol 25mg daily  -goal BP < 150/90, at goal  Vitamin D deficiency  - continue 2000u daily vitamin D, last level 29.6 as of Dec. 2023, slightly low  Anemia due to stage 4 chronic kidney disease  (HCC)  - Hgb 10.6 as of 10/28/24, low but improved  -prior iron panel showed low ferritin and low iron sat in Aug. 2020, iron 55, TIBC 315.   -Monitor CBC.  -Repeat iron panel acceptable as of 10/28/24  -r/o myeloma(SPEP/UPEP/FLC)     Nephrolithiasis   - noted on renal u/s performed March 2023  -recommend increased hydration to urinate 2-2.5L/day  -avoid high salt/sodium diet     RTC in 6 months. Obtain blood, urine testing prior to next office visit in late  Jan. 2025.

## 2024-11-13 ENCOUNTER — OFFICE VISIT (OUTPATIENT)
Dept: PODIATRY | Facility: CLINIC | Age: 84
End: 2024-11-13

## 2024-11-13 VITALS
HEART RATE: 88 BPM | DIASTOLIC BLOOD PRESSURE: 83 MMHG | BODY MASS INDEX: 29.44 KG/M2 | HEIGHT: 62 IN | WEIGHT: 160 LBS | SYSTOLIC BLOOD PRESSURE: 148 MMHG

## 2024-11-13 DIAGNOSIS — B35.1 ONYCHOMYCOSIS: Primary | ICD-10-CM

## 2024-11-13 PROCEDURE — NCFTCARE PR NON-COVERED FOOT CARE: Performed by: PODIATRIST

## 2024-11-13 NOTE — PROGRESS NOTES
PATIENT:  Emily Gama  1940    ASSESSMENT/PLAN:  1. Onychomycosis               The patient was educated in proper foot wear.  Also discussed daily foot assessment and proper foot care.  The patient will follow up in 10 weeks.      PROCEDURE:  All mycotic toenails were reduced and debrided in length, width, and girth using a nail nipper and dremel.  Patient tolerated procedure(s) well without complications.        HPI:  Emily Gama is a 83 y.o.year old adult seen for non-covered foot care.   The patient denied any acute pedal disorder.          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    Chronic gastric ulcer without hemorrhage and without perforation 04/20/2020    Chronic kidney disease     Colon polyps     Coronary artery disease     Gastroparesis     Glaucoma     Hyperlipidemia     Hypertension     Ischemic colitis (HCC) 11/2013    Macular degeneration     Pacemaker        PAST SURGICAL HISTORY:  Past Surgical History:   Procedure Laterality Date    BREAST BIOPSY      biopsy    CARDIAC ELECTROPHYSIOLOGY PROCEDURE N/A 05/17/2022    Procedure: Cardiac eps/aflutter ablation;  Surgeon: Carl Perez MD;  Location: [facility];  Service: Cardiology    CARDIAC ELECTROPHYSIOLOGY PROCEDURE N/A 05/17/2022    Procedure: CARDIAC PACER IMPLANT;  Surgeon: Carl Perez MD;  Location: [facility];  Service: Cardiology    CARDIAC ELECTROPHYSIOLOGY PROCEDURE Left 05/18/2022    Procedure: Cardiac lead revision;  Surgeon: Carl Perez MD;  Location: [facility];  Service: Cardiology    COLONOSCOPY  06/13/2013    COLONOSCOPY      June 2013 sigmoid diverticulosis and internal hemorrhoids.  Previous colonoscopy is with adenomatous polyps.    EGD  02/11/2013    EYE SURGERY Bilateral     cataract surgery    TONSILLECTOMY      UPPER  GASTROINTESTINAL ENDOSCOPY      March 2021 hiatal hernia, gastric ulcer healed, minimal gastritis.  March 2020 irregular Z-line, hiatal hernia, gastric ulcer.  Biopsies negative for H pylori or malignancy.  Biopsies negative for Leonard's.  February 2013 esophagus normal, gastritis and duodenitis, biopsies negative for H pylori.    WISDOM TOOTH EXTRACTION          ALLERGIES:  Patient has no known allergies.    MEDICATIONS:  Current Outpatient Medications   Medication Sig Dispense Refill    Cholecalciferol (VITAMIN D) 2000 units CAPS Take by mouth 1 daily      Eliquis 2.5 MG TAKE ONE TABLET BY MOUTH TWICE A  tablet 2    enalapril (VASOTEC) 10 mg tablet TAKE 1 TABLET BY MOUTH TWO TIMES A  tablet 1    flecainide (TAMBOCOR) 50 mg tablet Take 1 tablet (50 mg total) by mouth 2 (two) times a day 180 tablet 3    latanoprost (XALATAN) 0.005 % ophthalmic solution 1 drop both eyes at bedtime      metoprolol succinate (TOPROL-XL) 25 mg 24 hr tablet Take 1 tablet (25 mg total) by mouth daily 90 tablet 3    simvastatin (ZOCOR) 10 mg tablet TAKE 1 TABLET BY MOUTH AT BEDTIME 30 tablet 4    Multiple Vitamins-Minerals (PRESERVISION AREDS 2) CAPS Take by mouth 2 (two) times a day (Patient not taking: Reported on 11/5/2024)       No current facility-administered medications for this visit.       SOCIAL HISTORY:  Social History     Socioeconomic History    Marital status:      Spouse name: None    Number of children: None    Years of education: None    Highest education level: None   Occupational History    None   Tobacco Use    Smoking status: Never    Smokeless tobacco: Never   Vaping Use    Vaping status: Never Used   Substance and Sexual Activity    Alcohol use: Yes     Comment: Social    Drug use: No    Sexual activity: Never   Other Topics Concern    None   Social History Narrative    Active advance directive    Exercise habits---walks and does occasional stepper at home    Living alone    Living situation:  "supportive and safe    Sees dentist occasionally     Social Drivers of Health     Financial Resource Strain: Low Risk  (12/12/2023)    Overall Financial Resource Strain (CARDIA)     Difficulty of Paying Living Expenses: Not hard at all   Food Insecurity: No Food Insecurity (5/1/2024)    Nursing - Inadequate Food Risk Classification     Worried About Running Out of Food in the Last Year: Never true     Ran Out of Food in the Last Year: Never true     Ran Out of Food in the Last Year: Not on file   Transportation Needs: No Transportation Needs (5/1/2024)    PRAPARE - Transportation     Lack of Transportation (Medical): No     Lack of Transportation (Non-Medical): No   Physical Activity: Insufficiently Active (2/10/2021)    Exercise Vital Sign     Days of Exercise per Week: 3 days     Minutes of Exercise per Session: 30 min   Stress: Stress Concern Present (2/10/2021)    [employer] [place] [employer] - Occupational Stress Questionnaire     Feeling of Stress : To some extent   Social Connections: Not on file   Intimate Partner Violence: Not At Risk (2/10/2021)    Humiliation, Afraid, Rape, and Kick questionnaire     Fear of Current or Ex-Partner: No     Emotionally Abused: No     Physically Abused: No     Sexually Abused: No   Housing Stability: Unknown (5/1/2024)    Housing Stability Vital Sign     Unable to Pay for Housing in the Last Year: No     Number of Times Moved in the Last Year: Not on file     Homeless in the Last Year: No        REVIEW OF SYSTEMS:  GENERAL: No fever or chills  HEART: No chest pain, or palpitation  RESPIRATORY:  No acute SOB or cough  GI: No Nausea, vomit or diarrhea  NEUROLOGIC: No syncope or acute weakness    PHYSICAL EXAM:    /83 (Patient Position: Sitting, Cuff Size: Standard)   Pulse 88   Ht 5' 2\" (1.575 m)   Wt 72.6 kg (160 lb)   LMP  (LMP Unknown)   BMI 29.26 kg/m²     VASCULAR EXAM  Pedal pulses are intact.  CRT is WNL.     NEUROLOGIC EXAM  Sensation is intact to " light touch.  No focal neurologic deficit.          DERMATOLOGIC EXAM:   No ulcer or cellulitis noted.  The patient has hypertrophic toenails X 6 with discoloration, onycholysis, and subungal debris.      MUSCULOSKELETAL EXAM:   No acute joint pain, edema, or redness.  No acute musculoskeletal problem.

## 2024-11-24 DIAGNOSIS — I48.0 PAROXYSMAL ATRIAL FIBRILLATION (HCC): ICD-10-CM

## 2024-11-26 ENCOUNTER — APPOINTMENT (OUTPATIENT)
Dept: LAB | Facility: HOSPITAL | Age: 84
End: 2024-11-26
Payer: COMMERCIAL

## 2024-11-26 DIAGNOSIS — N18.4 CHRONIC RENAL DISEASE, STAGE IV (HCC): ICD-10-CM

## 2024-11-26 LAB
ANION GAP SERPL CALCULATED.3IONS-SCNC: 7 MMOL/L (ref 4–13)
BUN SERPL-MCNC: 30 MG/DL (ref 5–25)
CALCIUM SERPL-MCNC: 9.8 MG/DL (ref 8.4–10.2)
CHLORIDE SERPL-SCNC: 109 MMOL/L (ref 96–108)
CO2 SERPL-SCNC: 25 MMOL/L (ref 21–32)
CREAT SERPL-MCNC: 1.6 MG/DL (ref 0.6–1.3)
GLUCOSE P FAST SERPL-MCNC: 80 MG/DL (ref 65–99)
POTASSIUM SERPL-SCNC: 4.8 MMOL/L (ref 3.5–5.3)
SODIUM SERPL-SCNC: 141 MMOL/L (ref 135–147)

## 2024-11-26 PROCEDURE — 80048 BASIC METABOLIC PNL TOTAL CA: CPT

## 2024-11-26 PROCEDURE — 36415 COLL VENOUS BLD VENIPUNCTURE: CPT

## 2024-11-26 RX ORDER — METOPROLOL SUCCINATE 25 MG/1
25 TABLET, EXTENDED RELEASE ORAL DAILY
Qty: 90 TABLET | Refills: 1 | Status: SHIPPED | OUTPATIENT
Start: 2024-11-26

## 2024-11-26 NOTE — TELEPHONE ENCOUNTER
Pt is scheduled for a year f/u as requested by Dr. Martínez at [facility] ON 8/6/24, when told to Return in about 1 year (around 8/6/2025). Please refill at this time

## 2024-12-03 ENCOUNTER — OFFICE VISIT (OUTPATIENT)
Dept: FAMILY MEDICINE CLINIC | Facility: HOSPITAL | Age: 84
End: 2024-12-03
Payer: COMMERCIAL

## 2024-12-03 VITALS
SYSTOLIC BLOOD PRESSURE: 142 MMHG | OXYGEN SATURATION: 99 % | DIASTOLIC BLOOD PRESSURE: 80 MMHG | BODY MASS INDEX: 29.63 KG/M2 | HEART RATE: 97 BPM | HEIGHT: 62 IN | WEIGHT: 161 LBS

## 2024-12-03 DIAGNOSIS — E55.9 VITAMIN D DEFICIENCY: ICD-10-CM

## 2024-12-03 DIAGNOSIS — I48.0 PAROXYSMAL ATRIAL FIBRILLATION (HCC): ICD-10-CM

## 2024-12-03 DIAGNOSIS — I10 PRIMARY HYPERTENSION: Primary | ICD-10-CM

## 2024-12-03 DIAGNOSIS — N18.4 CHRONIC RENAL DISEASE, STAGE IV (HCC): ICD-10-CM

## 2024-12-03 PROCEDURE — 99214 OFFICE O/P EST MOD 30 MIN: CPT | Performed by: INTERNAL MEDICINE

## 2024-12-03 NOTE — PROGRESS NOTES
Assessment/Plan:     Diagnosis ICD-10-CM Associated Orders   1. Primary hypertension  I10 Lipid Panel with Direct LDL reflex     Comprehensive metabolic panel     CBC and differential      2. Chronic renal disease, stage IV (HCC)  N18.4 Lipid Panel with Direct LDL reflex     Comprehensive metabolic panel     CBC and differential      3. Vitamin D deficiency  E55.9 Vitamin D 25 hydroxy          Problem List Items Addressed This Visit          Cardiovascular and Mediastinum    Hypertension - Primary    Home readings are ok- slightly elevated today  Will have her check weekly at home and call if elevated  Metoprolol 25 daily and enalopril 10 bid         Relevant Orders    Lipid Panel with Direct LDL reflex    Comprehensive metabolic panel    CBC and differential       Genitourinary    Chronic renal disease, stage IV (HCC)    Relevant Orders    Lipid Panel with Direct LDL reflex    Comprehensive metabolic panel    CBC and differential       Other    Vitamin D deficiency    Relevant Orders    Vitamin D 25 hydroxy         Return in about 6 months (around 6/3/2025) for Recheck.      Subjective:    Patient ID: Emily Gama is a 84 y.o. adult    Here for  follow up- seen by dr Peters recently with nephrology- avoids nsaids ans discussed hydration   Was in [facility] and will be in [place] for [place]        The following portions of the patient's history were reviewed and updated as appropriate: allergies, current medications and problem list.     Review of Systems   Constitutional:  Negative for fatigue.   HENT:  Negative for congestion.    Eyes:         Seen every 6 months with glaucoma and macular degeneraton   Respiratory:  Negative for shortness of breath.    Cardiovascular:  Negative for chest pain and palpitations.   Gastrointestinal:  Negative for abdominal pain, constipation and diarrhea.   All other systems reviewed and are negative.        Objective:      Current Outpatient Medications:      "Cholecalciferol (VITAMIN D) 2000 units CAPS, Take by mouth 1 daily, Disp: , Rfl:     Eliquis 2.5 MG, TAKE ONE TABLET BY MOUTH TWICE A DAY, Disp: 180 tablet, Rfl: 2    enalapril (VASOTEC) 10 mg tablet, TAKE 1 TABLET BY MOUTH TWO TIMES A DAY, Disp: 180 tablet, Rfl: 1    flecainide (TAMBOCOR) 50 mg tablet, Take 1 tablet (50 mg total) by mouth 2 (two) times a day, Disp: 180 tablet, Rfl: 3    latanoprost (XALATAN) 0.005 % ophthalmic solution, 1 drop both eyes at bedtime, Disp: , Rfl:     metoprolol succinate (TOPROL-XL) 25 mg 24 hr tablet, TAKE ONE TABLET BY MOUTH EVERY DAY, Disp: 90 tablet, Rfl: 1    Multiple Vitamins-Minerals (PRESERVISION AREDS 2) CAPS, Take by mouth 2 (two) times a day, Disp: , Rfl:     simvastatin (ZOCOR) 10 mg tablet, TAKE 1 TABLET BY MOUTH AT BEDTIME, Disp: 30 tablet, Rfl: 4    Blood pressure 142/80, pulse 97, height 5' 2\" (1.575 m), weight 73 kg (161 lb), SpO2 99%.     Physical Exam  Vitals and nursing note reviewed.   HENT:      Head: Normocephalic.      Right Ear: Tympanic membrane normal. There is no impacted cerumen.      Left Ear: Tympanic membrane normal. There is no impacted cerumen.      Nose: No congestion.      Mouth/Throat:      Pharynx: No posterior oropharyngeal erythema.   Eyes:      General:         Right eye: No discharge.         Left eye: No discharge.   Cardiovascular:      Rate and Rhythm: Normal rate and regular rhythm.      Heart sounds: No murmur heard.  Pulmonary:      Breath sounds: No wheezing or rhonchi.   Abdominal:      Palpations: Abdomen is soft.      Tenderness: There is no abdominal tenderness. There is no guarding.   Musculoskeletal:         General: No tenderness.      Cervical back: No rigidity or tenderness.   Skin:     Findings: No erythema.   Neurological:      Mental Status: He is alert.      Motor: No weakness.   Psychiatric:         Thought Content: Thought content normal.         Judgment: Judgment normal.        "

## 2024-12-03 NOTE — ASSESSMENT & PLAN NOTE
Home readings are ok- slightly elevated today  Will have her check weekly at home and call if elevated  Metoprolol 25 daily and enalopril 10 bid

## 2024-12-30 DIAGNOSIS — E78.5 HYPERLIPIDEMIA, UNSPECIFIED HYPERLIPIDEMIA TYPE: ICD-10-CM

## 2024-12-30 DIAGNOSIS — I48.0 PAROXYSMAL ATRIAL FIBRILLATION (HCC): ICD-10-CM

## 2024-12-31 RX ORDER — SIMVASTATIN 10 MG
10 TABLET ORAL
Qty: 30 TABLET | Refills: 0 | Status: SHIPPED | OUTPATIENT
Start: 2024-12-31

## 2024-12-31 RX ORDER — FLECAINIDE ACETATE 50 MG/1
50 TABLET ORAL 2 TIMES DAILY
Qty: 180 TABLET | Refills: 3 | Status: SHIPPED | OUTPATIENT
Start: 2024-12-31

## 2025-01-22 ENCOUNTER — OFFICE VISIT (OUTPATIENT)
Dept: PODIATRY | Facility: CLINIC | Age: 85
End: 2025-01-22

## 2025-01-22 VITALS — HEIGHT: 62 IN | BODY MASS INDEX: 29.26 KG/M2 | WEIGHT: 159 LBS

## 2025-01-22 DIAGNOSIS — B35.1 ONYCHOMYCOSIS: Primary | ICD-10-CM

## 2025-01-22 PROCEDURE — NCFTCARE PR NON-COVERED FOOT CARE: Performed by: PODIATRIST

## 2025-01-22 NOTE — PROGRESS NOTES
PATIENT:  Emily Gama  1940    ASSESSMENT/PLAN:  1. Onychomycosis               The patient was educated in proper foot wear.  Also discussed daily foot assessment and proper foot care.  The patient will follow up in 10 weeks.      PROCEDURE:  All mycotic toenails were reduced and debrided in length, width, and girth using a nail nipper and dremel.  Patient tolerated procedure(s) well without complications.        HPI:  Emily Gama is a 84 y.o.year old adult seen for non-covered foot care.   The patient denied any acute pedal disorder.          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    Chronic gastric ulcer without hemorrhage and without perforation 04/20/2020    Chronic kidney disease     Colon polyps     Coronary artery disease     Gastroparesis     Glaucoma     Hyperlipidemia     Hypertension     Ischemic colitis (HCC) 11/2013    Macular degeneration     Pacemaker        PAST SURGICAL HISTORY:  Past Surgical History:   Procedure Laterality Date    BREAST BIOPSY      biopsy    CARDIAC ELECTROPHYSIOLOGY PROCEDURE N/A 05/17/2022    Procedure: Cardiac eps/aflutter ablation;  Surgeon: Carl Perez MD;  [facility];  Service: Cardiology    CARDIAC ELECTROPHYSIOLOGY PROCEDURE N/A 05/17/2022    Procedure: CARDIAC PACER IMPLANT;  Surgeon: Carl Perez MD;  [facility];  Service: Cardiology    CARDIAC ELECTROPHYSIOLOGY PROCEDURE Left 05/18/2022    Procedure: Cardiac lead revision;  Surgeon: Carl Perez MD;  [facility]: [facility];  Service: Cardiology    COLONOSCOPY  06/13/2013    COLONOSCOPY      June 2013 sigmoid diverticulosis and internal hemorrhoids.  Previous colonoscopy is with adenomatous polyps.    EGD  02/11/2013    EYE SURGERY Bilateral     cataract surgery    TONSILLECTOMY      UPPER  GASTROINTESTINAL ENDOSCOPY      March 2021 hiatal hernia, gastric ulcer healed, minimal gastritis.  March 2020 irregular Z-line, hiatal hernia, gastric ulcer.  Biopsies negative for H pylori or malignancy.  Biopsies negative for Leonard's.  February 2013 esophagus normal, gastritis and duodenitis, biopsies negative for H pylori.    WISDOM TOOTH EXTRACTION          ALLERGIES:  Patient has no known allergies.    MEDICATIONS:  Current Outpatient Medications   Medication Sig Dispense Refill    Cholecalciferol (VITAMIN D) 2000 units CAPS Take by mouth 1 daily      Eliquis 2.5 MG TAKE ONE TABLET BY MOUTH TWICE A  tablet 2    enalapril (VASOTEC) 10 mg tablet TAKE 1 TABLET BY MOUTH TWO TIMES A  tablet 1    flecainide (TAMBOCOR) 50 mg tablet TAKE ONE TABLET BY MOUTH TWICE A  tablet 3    latanoprost (XALATAN) 0.005 % ophthalmic solution 1 drop both eyes at bedtime      metoprolol succinate (TOPROL-XL) 25 mg 24 hr tablet TAKE ONE TABLET BY MOUTH EVERY DAY 90 tablet 1    Multiple Vitamins-Minerals (PRESERVISION AREDS 2) CAPS Take by mouth 2 (two) times a day      simvastatin (ZOCOR) 10 mg tablet TAKE ONE TABLET BY MOUTH DAILY AT BEDTIME 30 tablet 0     No current facility-administered medications for this visit.       SOCIAL HISTORY:  Social History     Socioeconomic History    Marital status:      Spouse name: None    Number of children: None    Years of education: None    Highest education level: None   Occupational History    None   Tobacco Use    Smoking status: Never    Smokeless tobacco: Never   Vaping Use    Vaping status: Never Used   Substance and Sexual Activity    Alcohol use: Yes     Comment: Social    Drug use: No    Sexual activity: Never   Other Topics Concern    None   Social History Narrative    Active advance directive    Exercise habits---walks and does occasional stepper at home    Living alone    Living situation: supportive and safe    Sees dentist occasionally     Social  "Drivers of Health     Financial Resource Strain: Low Risk  (12/12/2023)    Overall Financial Resource Strain (CARDIA)     Difficulty of Paying Living Expenses: Not hard at all   Food Insecurity: No Food Insecurity (5/1/2024)    Nursing - Inadequate Food Risk Classification     Worried About Running Out of Food in the Last Year: Never true     Ran Out of Food in the Last Year: Never true     Ran Out of Food in the Last Year: Not on file   Transportation Needs: No Transportation Needs (5/1/2024)    PRAPARE - Transportation     Lack of Transportation (Medical): No     Lack of Transportation (Non-Medical): No   Physical Activity: Insufficiently Active (2/10/2021)    Exercise Vital Sign     Days of Exercise per Week: 3 days     Minutes of Exercise per Session: 30 min   Stress: Stress Concern Present (2/10/2021)    [employer] - Occupational Stress Questionnaire     Feeling of Stress : To some extent   Social Connections: Not on file   Intimate Partner Violence: Not At Risk (2/10/2021)    Humiliation, Afraid, Rape, and Kick questionnaire     Fear of Current or Ex-Partner: No     Emotionally Abused: No     Physically Abused: No     Sexually Abused: No   Housing Stability: Unknown (5/1/2024)    Housing Stability Vital Sign     Unable to Pay for Housing in the Last Year: No     Number of Times Moved in the Last Year: Not on file     Homeless in the Last Year: No        REVIEW OF SYSTEMS:  GENERAL: No fever or chills  HEART: No chest pain, or palpitation  RESPIRATORY:  No acute SOB or cough  GI: No Nausea, vomit or diarrhea  NEUROLOGIC: No syncope or acute weakness    PHYSICAL EXAM:    Ht 5' 2\" (1.575 m)   Wt 72.1 kg (159 lb)   LMP  (LMP Unknown)   BMI 29.08 kg/m²     VASCULAR EXAM  Pedal pulses are intact.  CRT is WNL.     NEUROLOGIC EXAM  Sensation is intact to light touch.  No focal neurologic deficit.          DERMATOLOGIC EXAM:   No ulcer or cellulitis noted.  The patient has hypertrophic " toenails X 6 with discoloration, onycholysis, and subungal debris.      MUSCULOSKELETAL EXAM:   No acute joint pain, edema, or redness.  No acute musculoskeletal problem.

## 2025-02-20 DIAGNOSIS — I10 PRIMARY HYPERTENSION: ICD-10-CM

## 2025-02-21 RX ORDER — ENALAPRIL MALEATE 10 MG/1
10 TABLET ORAL 2 TIMES DAILY
Qty: 180 TABLET | Refills: 1 | Status: SHIPPED | OUTPATIENT
Start: 2025-02-21

## 2025-02-24 DIAGNOSIS — E78.5 HYPERLIPIDEMIA, UNSPECIFIED HYPERLIPIDEMIA TYPE: ICD-10-CM

## 2025-02-25 RX ORDER — SIMVASTATIN 10 MG
10 TABLET ORAL
Qty: 30 TABLET | Refills: 0 | Status: SHIPPED | OUTPATIENT
Start: 2025-02-25

## 2025-02-26 ENCOUNTER — RESULTS FOLLOW-UP (OUTPATIENT)
Dept: CARDIOLOGY CLINIC | Facility: CLINIC | Age: 85
End: 2025-02-26

## 2025-02-26 ENCOUNTER — IN-CLINIC DEVICE VISIT (OUTPATIENT)
Dept: CARDIOLOGY CLINIC | Facility: CLINIC | Age: 85
End: 2025-02-26
Payer: COMMERCIAL

## 2025-02-26 DIAGNOSIS — Z95.0 CARDIAC PACEMAKER IN SITU: Primary | ICD-10-CM

## 2025-02-26 PROCEDURE — 93280 PM DEVICE PROGR EVAL DUAL: CPT | Performed by: INTERNAL MEDICINE

## 2025-02-26 NOTE — PROGRESS NOTES
Results for orders placed or performed in visit on 02/26/25   Cardiac EP device report    Narrative    MDT DC PM / ACTIVE SYSTEM IS MRI CONDITIONAL  DEVICE INTERROGATED IN THE [facility] OFFICE: BATTERY VOLTAGE ADEQUATE-10 YRS. %  0%. ALL AVAILABLE LEAD PARAMETERS WITHIN NORMAL LIMITS. 2 AT/AF NOTED; 52 MINS LONG; SAME DAY; 0% BURDEN. AVAIL EGRAMS SHOWING AF. PT ON FLECAINIDE, ELIQUIS, & METO SUCC. NO PROGRAMMING CHANGES MADE TO DEVICE PARAMETERS. NORMAL DEVICE FUNCTION. NC

## 2025-03-28 DIAGNOSIS — E78.5 HYPERLIPIDEMIA, UNSPECIFIED HYPERLIPIDEMIA TYPE: ICD-10-CM

## 2025-03-28 RX ORDER — SIMVASTATIN 10 MG
10 TABLET ORAL
Qty: 30 TABLET | Refills: 0 | Status: SHIPPED | OUTPATIENT
Start: 2025-03-28

## 2025-03-31 ENCOUNTER — APPOINTMENT (EMERGENCY)
Dept: RADIOLOGY | Facility: HOSPITAL | Age: 85
End: 2025-03-31
Payer: COMMERCIAL

## 2025-03-31 ENCOUNTER — HOSPITAL ENCOUNTER (OUTPATIENT)
Facility: HOSPITAL | Age: 85
Setting detail: OBSERVATION
Discharge: HOME | End: 2025-04-01
Attending: EMERGENCY MEDICINE | Admitting: INTERNAL MEDICINE
Payer: COMMERCIAL

## 2025-03-31 ENCOUNTER — APPOINTMENT (OUTPATIENT)
Dept: CT IMAGING | Facility: HOSPITAL | Age: 85
End: 2025-03-31
Payer: COMMERCIAL

## 2025-03-31 DIAGNOSIS — R51.9 FACIAL PAIN, ACUTE: ICD-10-CM

## 2025-03-31 DIAGNOSIS — Z01.89 ENCOUNTER FOR IMAGING TO SCREEN FOR METAL PRIOR TO MRI: Primary | ICD-10-CM

## 2025-03-31 DIAGNOSIS — R52 SHARP PAIN: ICD-10-CM

## 2025-03-31 DIAGNOSIS — Z95.0 PACEMAKER: ICD-10-CM

## 2025-03-31 PROBLEM — E06.9 THYROIDITIS: Status: ACTIVE | Noted: 2025-03-31

## 2025-03-31 LAB
ANION GAP SERPL CALCULATED.3IONS-SCNC: 6 MMOL/L (ref 4–13)
BASOPHILS # BLD AUTO: 0.01 THOUSANDS/ÂΜL (ref 0–0.1)
BASOPHILS NFR BLD AUTO: 0 % (ref 0–1)
BUN SERPL-MCNC: 29 MG/DL (ref 5–25)
CALCIUM SERPL-MCNC: 9.6 MG/DL (ref 8.4–10.2)
CHLORIDE SERPL-SCNC: 110 MMOL/L (ref 96–108)
CO2 SERPL-SCNC: 22 MMOL/L (ref 21–32)
CREAT SERPL-MCNC: 1.79 MG/DL (ref 0.6–1.3)
EOSINOPHIL # BLD AUTO: 0.29 THOUSAND/ÂΜL (ref 0–0.61)
EOSINOPHIL NFR BLD AUTO: 5 % (ref 0–6)
ERYTHROCYTE [DISTWIDTH] IN BLOOD BY AUTOMATED COUNT: 13.3 % (ref 11.6–15.1)
ERYTHROCYTE [SEDIMENTATION RATE] IN BLOOD: 18 MM/HOUR (ref 0–19)
GLUCOSE SERPL-MCNC: 115 MG/DL (ref 65–140)
HCT VFR BLD AUTO: 33.7 % (ref 36.5–46.1)
HGB BLD-MCNC: 10.8 G/DL (ref 12–15.4)
IMM GRANULOCYTES # BLD AUTO: 0.03 THOUSAND/UL (ref 0–0.2)
IMM GRANULOCYTES NFR BLD AUTO: 1 % (ref 0–2)
LYMPHOCYTES # BLD AUTO: 1.49 THOUSANDS/ÂΜL (ref 0.6–4.47)
LYMPHOCYTES NFR BLD AUTO: 24 % (ref 14–44)
MCH RBC QN AUTO: 30.3 PG (ref 26.8–34.3)
MCHC RBC AUTO-ENTMCNC: 32 G/DL (ref 31.4–37.4)
MCV RBC AUTO: 94 FL (ref 82–98)
MONOCYTES # BLD AUTO: 0.34 THOUSAND/ÂΜL (ref 0.17–1.22)
MONOCYTES NFR BLD AUTO: 6 % (ref 4–12)
NEUTROPHILS # BLD AUTO: 4.05 THOUSANDS/ÂΜL (ref 1.85–7.62)
NEUTS SEG NFR BLD AUTO: 64 % (ref 43–75)
NRBC BLD AUTO-RTO: 0 /100 WBCS
PLATELET # BLD AUTO: 217 THOUSANDS/UL (ref 149–390)
PMV BLD AUTO: 11 FL (ref 8.9–12.7)
POTASSIUM SERPL-SCNC: 4.6 MMOL/L (ref 3.5–5.3)
RBC # BLD AUTO: 3.57 MILLION/UL (ref 3.88–5.12)
SODIUM SERPL-SCNC: 138 MMOL/L (ref 135–147)
TSH SERPL DL<=0.05 MIU/L-ACNC: 2.62 UIU/ML (ref 0.45–4.5)
WBC # BLD AUTO: 6.21 THOUSAND/UL (ref 4.31–10.16)

## 2025-03-31 PROCEDURE — 99222 1ST HOSP IP/OBS MODERATE 55: CPT | Performed by: INTERNAL MEDICINE

## 2025-03-31 PROCEDURE — 70486 CT MAXILLOFACIAL W/O DYE: CPT

## 2025-03-31 PROCEDURE — 80048 BASIC METABOLIC PNL TOTAL CA: CPT | Performed by: EMERGENCY MEDICINE

## 2025-03-31 PROCEDURE — 93005 ELECTROCARDIOGRAM TRACING: CPT

## 2025-03-31 PROCEDURE — 84443 ASSAY THYROID STIM HORMONE: CPT | Performed by: INTERNAL MEDICINE

## 2025-03-31 PROCEDURE — 85652 RBC SED RATE AUTOMATED: CPT | Performed by: EMERGENCY MEDICINE

## 2025-03-31 PROCEDURE — 85025 COMPLETE CBC W/AUTO DIFF WBC: CPT | Performed by: EMERGENCY MEDICINE

## 2025-03-31 PROCEDURE — 36415 COLL VENOUS BLD VENIPUNCTURE: CPT | Performed by: EMERGENCY MEDICINE

## 2025-03-31 PROCEDURE — 70490 CT SOFT TISSUE NECK W/O DYE: CPT

## 2025-03-31 PROCEDURE — 99283 EMERGENCY DEPT VISIT LOW MDM: CPT

## 2025-03-31 RX ORDER — FLECAINIDE ACETATE 100 MG/1
50 TABLET ORAL 2 TIMES DAILY
Status: DISCONTINUED | OUTPATIENT
Start: 2025-03-31 | End: 2025-04-01 | Stop reason: HOSPADM

## 2025-03-31 RX ORDER — ACETAMINOPHEN 325 MG/1
650 TABLET ORAL EVERY 6 HOURS PRN
Status: DISCONTINUED | OUTPATIENT
Start: 2025-03-31 | End: 2025-04-01 | Stop reason: HOSPADM

## 2025-03-31 RX ORDER — ONDANSETRON 2 MG/ML
4 INJECTION INTRAMUSCULAR; INTRAVENOUS EVERY 6 HOURS PRN
Status: DISCONTINUED | OUTPATIENT
Start: 2025-03-31 | End: 2025-04-01 | Stop reason: HOSPADM

## 2025-03-31 RX ORDER — LATANOPROST 50 UG/ML
1 SOLUTION/ DROPS OPHTHALMIC
Status: DISCONTINUED | OUTPATIENT
Start: 2025-03-31 | End: 2025-04-01 | Stop reason: HOSPADM

## 2025-03-31 RX ORDER — METOPROLOL SUCCINATE 25 MG/1
25 TABLET, EXTENDED RELEASE ORAL DAILY
Status: DISCONTINUED | OUTPATIENT
Start: 2025-04-01 | End: 2025-04-01 | Stop reason: HOSPADM

## 2025-03-31 RX ORDER — PRAVASTATIN SODIUM 20 MG
20 TABLET ORAL
Status: DISCONTINUED | OUTPATIENT
Start: 2025-03-31 | End: 2025-04-01 | Stop reason: HOSPADM

## 2025-03-31 RX ORDER — LISINOPRIL 20 MG/1
20 TABLET ORAL DAILY
Status: DISCONTINUED | OUTPATIENT
Start: 2025-04-01 | End: 2025-04-01 | Stop reason: HOSPADM

## 2025-03-31 RX ORDER — GABAPENTIN 100 MG/1
100 CAPSULE ORAL 3 TIMES DAILY
Status: DISCONTINUED | OUTPATIENT
Start: 2025-03-31 | End: 2025-04-01 | Stop reason: HOSPADM

## 2025-03-31 RX ADMIN — FLECAINIDE ACETATE 50 MG: 100 TABLET ORAL at 19:40

## 2025-03-31 RX ADMIN — GABAPENTIN 100 MG: 100 CAPSULE ORAL at 19:40

## 2025-03-31 RX ADMIN — LATANOPROST 1 DROP: 50 SOLUTION OPHTHALMIC at 21:24

## 2025-03-31 RX ADMIN — APIXABAN 2.5 MG: 2.5 TABLET, FILM COATED ORAL at 19:40

## 2025-03-31 RX ADMIN — PRAVASTATIN SODIUM 20 MG: 20 TABLET ORAL at 19:40

## 2025-03-31 NOTE — ASSESSMENT & PLAN NOTE
Lab Results   Component Value Date    EGFR 28 04/24/2024    EGFR 30 04/18/2024    EGFR 24 04/03/2024    CREATININE 1.79 (H) 03/31/2025    CREATININE 1.60 (H) 11/26/2024    CREATININE 1.56 (H) 10/28/2024   Follows with nephrology as outpatient  Monitor BMP  Renally dose medications  Avoid hypotension

## 2025-03-31 NOTE — ED PROVIDER NOTES
"Time reflects when diagnosis was documented in both MDM as applicable and the Disposition within this note       Time User Action Codes Description Comment    3/31/2025 11:58 AM Laith Sarkar Add [Z01.89] Encounter for imaging to screen for metal prior to MRI     3/31/2025 11:58 AM Laith Sarkar Add [Z95.0] Pacemaker           ED Disposition       None          Assessment & Plan       Medical Decision Making  84-year-old female with demise of pain left posterolateral neck that radiates towards the mastoid and at times to the left cheek.  No mechanical stimulation reproduces her symptoms.  No recent trauma or fever.  Concern for odontogenic or otologic infection, temporal arteritis, left vertebral artery dissection.  No focal neurologic changes or rash.  No signs of infection.  Discussed with radiologist.  Will order MRA of neck to rule out pathology.  Patient has had recent CTs of neck without contrast (due to CKD).  Done for odynophagia, which has improved. Result from 5/6/24: \"Near total resolution of previously noted edema and soft tissue swelling involving the retropharyngeal region including bilateral carotid spaces, posterior triangles and superior mediastinum.   There is again heterogeneous density of the thyroid with resolved surrounding infiltrative changes and edema.\"    Patient signed out to Dr. Sanders at end of my shift.  She remained stable, waiting for MRA of neck.      Amount and/or Complexity of Data Reviewed  External Data Reviewed: labs, radiology and notes.  Labs: ordered.  Radiology: ordered.        ED Course as of 03/31/25 1626   Mon Mar 31, 2025   1103 Patient says she is not allowed to have CT contrast dye.  She has CKD. I discussed case with radiology, Dr. Macias.  He agrees that she is cleared to do MRA of the neck with and without contrast to rule out vertebral artery dissection.  Order placed.  I discussed with MRI tech, who is aware.       Medications - No data to " display    ED Risk Strat Scores                            SBIRT 22yo+      Flowsheet Row Most Recent Value   Initial Alcohol Screen: US AUDIT-C     1. How often do you have a drink containing alcohol? 0 Filed at: 03/31/2025 0907   2. How many drinks containing alcohol do you have on a typical day you are drinking?  0 Filed at: 03/31/2025 0907   3a. Male UNDER 65: How often do you have five or more drinks on one occasion? 0 Filed at: 03/31/2025 0907   3b. FEMALE Any Age, or MALE 65+: How often do you have 4 or more drinks on one occassion? 0 Filed at: 03/31/2025 0907   Audit-C Score 0 Filed at: 03/31/2025 0907   DAVID: How many times in the past year have you...    Used an illegal drug or used a prescription medication for non-medical reasons? Never Filed at: 03/31/2025 0907                            History of Present Illness       Chief Complaint   Patient presents with    Pain     To ED for left sided facail pain . Denies any visual changes. States that she has had congestion and sinus pain x 10 days.       Past Medical History:   Diagnosis Date    Arthritis     Last assessed: 10/9/13    Atrial fibrillation (HCC)     Cancer (HCC)     squamous cell of the face    Chronic gastric ulcer without hemorrhage and without perforation 04/20/2020    Chronic kidney disease     Colon polyps     Coronary artery disease     Gastroparesis     Glaucoma     Hyperlipidemia     Hypertension     Ischemic colitis (HCC) 11/2013    Macular degeneration     Pacemaker       Past Surgical History:   Procedure Laterality Date    BREAST BIOPSY      biopsy    CARDIAC ELECTROPHYSIOLOGY PROCEDURE N/A 05/17/2022    Procedure: Cardiac eps/aflutter ablation;  Surgeon: Carl Perez MD;  [facility];  Service: Cardiology    CARDIAC ELECTROPHYSIOLOGY PROCEDURE N/A 05/17/2022    Procedure: CARDIAC PACER IMPLANT;  Surgeon: Carl Perez MD;  [facility];  Service: Cardiology    CARDIAC ELECTROPHYSIOLOGY  PROCEDURE Left 05/18/2022    Procedure: Cardiac lead revision;  Surgeon: Carl Perez MD;  [facility];  Service: Cardiology    COLONOSCOPY  06/13/2013    COLONOSCOPY      June 2013 sigmoid diverticulosis and internal hemorrhoids.  Previous colonoscopy is with adenomatous polyps.    EGD  02/11/2013    EYE SURGERY Bilateral     cataract surgery    TONSILLECTOMY      UPPER GASTROINTESTINAL ENDOSCOPY      March 2021 hiatal hernia, gastric ulcer healed, minimal gastritis.  March 2020 irregular Z-line, hiatal hernia, gastric ulcer.  Biopsies negative for H pylori or malignancy.  Biopsies negative for Leonard's.  February 2013 esophagus normal, gastritis and duodenitis, biopsies negative for H pylori.    WISDOM TOOTH EXTRACTION        Family History   Problem Relation Age of Onset    Hypertension Mother     Stroke Mother     Kidney disease Father     Hypertension Father     Cancer Father     Stomach cancer Father     Dialysis Father     Single kidney Father     Substance Abuse Neg Hx     Mental illness Neg Hx     Colon polyps Neg Hx     Colon cancer Neg Hx       Social History     Tobacco Use    Smoking status: Never    Smokeless tobacco: Never   Vaping Use    Vaping status: Never Used   Substance Use Topics    Alcohol use: Yes     Comment: Social    Drug use: No      E-Cigarette/Vaping    E-Cigarette Use Never User       E-Cigarette/Vaping Substances    Nicotine No     THC No     CBD No     Flavoring No     Other No     Unknown No       I have reviewed and agree with the history as documented.     84-year-old female with history of atrial fibrillation on Eliquis, CAD, chronic kidney disease, hypertension, hyperlipidemia and remote squamous cell carcinoma of the face that is in remission complains of intermittent left neck and mastoid pain since yesterday.  Today the pain intermittently radiated towards the left cheekbone as well.  Fluctuates in intensity.  No exacerbating or alleviating factor.   Does note copious yellowish/clear nasal drainage for 10 days that has improved.  Pain does not localize to teeth, ear or sinus location.  Denies other dental problems or change in hearing lately.  Denies other headache, vertigo, change in vision.  No temporal pain with mastication.  No paresthesia or facial weakness.  No change in taste. No other neck pain or radiculopathy.        Review of Systems   Constitutional:  Negative for fever.   HENT:  Positive for postnasal drip, rhinorrhea and sinus pressure. Negative for dental problem, ear discharge, hearing loss, sore throat, tinnitus, trouble swallowing and voice change.    Eyes:  Negative for visual disturbance.   Respiratory:  Negative for cough and shortness of breath.    Cardiovascular:  Negative for chest pain and palpitations.   Musculoskeletal:  Negative for neck pain and neck stiffness.   Skin:  Negative for rash and wound.   Neurological:  Negative for dizziness, seizures, syncope, facial asymmetry, speech difficulty, numbness and headaches.           Objective       ED Triage Vitals   Temperature Pulse Blood Pressure Respirations SpO2 Patient Position - Orthostatic VS   03/31/25 0909 03/31/25 0905 03/31/25 0905 03/31/25 0905 03/31/25 0905 03/31/25 0905   98.1 °F (36.7 °C) 94 (!) 178/75 20 96 % Lying      Temp Source Heart Rate Source BP Location FiO2 (%) Pain Score    03/31/25 0909 03/31/25 0905 03/31/25 0905 -- 03/31/25 0905    Oral Monitor Left arm  3      Vitals      Date and Time Temp Pulse SpO2 Resp BP Pain Score FACES Pain Rating User   03/31/25 1200 -- 70 98 % 18 147/65 -- -- ML   03/31/25 1103 -- -- -- -- -- No Pain -- ML   03/31/25 1100 -- 70 98 % 18 154/66 -- -- ML   03/31/25 1000 -- 74 98 % 18 169/72 -- -- ML   03/31/25 0909 98.1 °F (36.7 °C) -- -- -- -- -- -- LJF   03/31/25 0905 -- 94 96 % 20 178/75 3 -- LJF            Physical Exam  Vitals and nursing note reviewed.   Constitutional:       General: He is not in acute distress.     Appearance:  He is well-developed. He is not ill-appearing or diaphoretic.   HENT:      Head: Normocephalic and atraumatic.      Comments: No tenderness nor nodularity over left temporal artery.  No tenderness, warmth, erythema or edema of left parotid or submandibular glands.     Right Ear: Tympanic membrane, ear canal and external ear normal.      Left Ear: Tympanic membrane, ear canal and external ear normal.      Nose: No congestion or rhinorrhea.      Mouth/Throat:      Mouth: Mucous membranes are moist.      Pharynx: Oropharynx is clear. No oropharyngeal exudate or posterior oropharyngeal erythema.      Comments: Missing several teeth.  No dental tenderness, gingival or intraoral swelling or drainage.  Eyes:      General: No scleral icterus.     Extraocular Movements: Extraocular movements intact.      Conjunctiva/sclera: Conjunctivae normal.      Pupils: Pupils are equal, round, and reactive to light.   Neck:      Vascular: No carotid bruit.   Cardiovascular:      Rate and Rhythm: Normal rate and regular rhythm.      Pulses: Normal pulses.      Heart sounds: Normal heart sounds.   Pulmonary:      Effort: Pulmonary effort is normal. No respiratory distress.      Breath sounds: Normal breath sounds.   Abdominal:      General: Bowel sounds are normal.      Palpations: Abdomen is soft.      Tenderness: There is no abdominal tenderness.   Musculoskeletal:         General: No tenderness. Normal range of motion.      Cervical back: Normal range of motion and neck supple. No tenderness.      Right lower leg: No edema.      Left lower leg: No edema.   Lymphadenopathy:      Cervical: No cervical adenopathy.   Skin:     General: Skin is warm and dry.      Capillary Refill: Capillary refill takes less than 2 seconds.      Findings: No bruising or rash.   Neurological:      General: No focal deficit present.      Mental Status: He is alert and oriented to person, place, and time. Mental status is at baseline.      Cranial Nerves: No  cranial nerve deficit.      Sensory: No sensory deficit.      Motor: No weakness.      Coordination: Coordination normal.      Deep Tendon Reflexes: Reflexes are normal and symmetric.   Psychiatric:         Mood and Affect: Mood normal.         Behavior: Behavior normal.         Results Reviewed       Procedure Component Value Units Date/Time    Sedimentation rate, automated [[id number]]  (Normal) Collected: 03/31/25 1113    Lab Status: Final result Specimen: Blood from Arm, Left Updated: 03/31/25 1139     Sed Rate 18 mm/hour     Basic metabolic panel [[id number]]  (Abnormal) Collected: 03/31/25 1113    Lab Status: Final result Specimen: Blood from Arm, Left Updated: 03/31/25 1137     Sodium 138 mmol/L      Potassium 4.6 mmol/L      Chloride 110 mmol/L      CO2 22 mmol/L      ANION GAP 6 mmol/L      BUN 29 mg/dL      Creatinine 1.79 mg/dL      Glucose 115 mg/dL      Calcium 9.6 mg/dL      eGFR --    Narrative:      Notes:     1. eGFR calculation is only valid for adults 18 years and older.  2. EGFR calculation cannot be performed for patients who are transgender, non-binary, or whose legal sex, sex at birth, and gender identity differ.    CBC and differential [[id number]]  (Abnormal) Collected: 03/31/25 1113    Lab Status: Final result Specimen: Blood from Arm, Left Updated: 03/31/25 1120     WBC 6.21 Thousand/uL      RBC 3.57 Million/uL      Hemoglobin 10.8 g/dL      Hematocrit 33.7 %      MCV 94 fL      MCH 30.3 pg      MCHC 32.0 g/dL      RDW 13.3 %      MPV 11.0 fL      Platelets 217 Thousands/uL      nRBC 0 /100 WBCs      Segmented % 64 %      Immature Grans % 1 %      Lymphocytes % 24 %      Monocytes % 6 %      Eosinophils Relative 5 %      Basophils Relative 0 %      Absolute Neutrophils 4.05 Thousands/µL      Absolute Immature Grans 0.03 Thousand/uL      Absolute Lymphocytes 1.49 Thousands/µL      Absolute Monocytes 0.34 Thousand/µL      Eosinophils Absolute 0.29 Thousand/µL      Basophils Absolute 0.01  Thousands/µL             MRI ED order    (Results Pending)   XR follow up    (Results Pending)       Procedures    ED Medication and Procedure Management   Prior to Admission Medications   Prescriptions Last Dose Informant Patient Reported? Taking?   Cholecalciferol (VITAMIN D) 2000 units CAPS  Self Yes No   Sig: Take by mouth 1 daily   Eliquis 2.5 MG   No No   Sig: TAKE ONE TABLET BY MOUTH TWICE A DAY   Multiple Vitamins-Minerals (PRESERVISION AREDS 2) CAPS  Self Yes No   Sig: Take by mouth 2 (two) times a day   enalapril (VASOTEC) 10 mg tablet   No No   Sig: TAKE 1 TABLET BY MOUTH TWO TIMES A DAY   flecainide (TAMBOCOR) 50 mg tablet   No No   Sig: TAKE ONE TABLET BY MOUTH TWICE A DAY   latanoprost (XALATAN) 0.005 % ophthalmic solution  Self Yes No   Si drop both eyes at bedtime   metoprolol succinate (TOPROL-XL) 25 mg 24 hr tablet   No No   Sig: TAKE ONE TABLET BY MOUTH EVERY DAY   simvastatin (ZOCOR) 10 mg tablet   No No   Sig: TAKE ONE TABLET BY MOUTH DAILY AT BEDTIME      Facility-Administered Medications: None     Patient's Medications   Discharge Prescriptions    No medications on file     No discharge procedures on file.  ED SEPSIS DOCUMENTATION   Time reflects when diagnosis was documented in both MDM as applicable and the Disposition within this note       Time User Action Codes Description Comment    3/31/2025 11:58 AM Laith Sarkar [Z01.89] Encounter for imaging to screen for metal prior to MRI     3/31/2025 11:58 AM Laith Sarkar [Z95.0] Pacemaker                  Siddharth Cates DO  25 1626

## 2025-03-31 NOTE — ASSESSMENT & PLAN NOTE
Presenting with sharp shooting pain left side behind the ear  Likely trigeminal neuralgia  CT facial bones  Will consult neurology  Trial gabapentin for pain

## 2025-03-31 NOTE — H&P
H&P - Hospitalist   Name: Emily Gama 84 y.o. adult I MRN: [id number]  Unit/Bed#: [facility] I Date of Admission: 3/31/2025   Date of Service: 3/31/2025 I Hospital Day: 0     Assessment & Plan  Atrial fibrillation (HCC)  Heart rate appears to be controlled  S/P PPM  Continue home medications flecainide, metoprolol    Hyperlipidemia  On Crestor, will substitute with pravastatin  Hypertension  On enalapril, substituted with lisinopril  Chronic renal disease, stage IV (HCC)  Lab Results   Component Value Date    EGFR 28 04/24/2024    EGFR 30 04/18/2024    EGFR 24 04/03/2024    CREATININE 1.79 (H) 03/31/2025    CREATININE 1.60 (H) 11/26/2024    CREATININE 1.56 (H) 10/28/2024   Follows with nephrology as outpatient  Monitor BMP  Renally dose medications  Avoid hypotension  TMJ arthralgia  History of arthralgia noted on chart    Thyroiditis  Heterogeneous density of thyroid with infiltrative changes and edema noted on CAT scans from 5/24  Check TSH  Check CT soft tissue neck  Sharp pain  Presenting with sharp shooting pain left side behind the ear  Likely trigeminal neuralgia  CT facial bones  Will consult neurology  Trial gabapentin for pain      VTE Pharmacologic Prophylaxis:   Moderate Risk (Score 3-4) - Pharmacological DVT Prophylaxis Ordered: apixaban (Eliquis).  Code Status: Level 3 - DNAR and DNI   Discussion with family: Patient declined call to .     Anticipated Length of Stay: Patient will be admitted on an observation basis with an anticipated length of stay of less than 2 midnights secondary to MRA pending.    History of Present Illness   Chief Complaint:   Chief Complaint   Patient presents with    Pain     To ED for left sided facail pain . Denies any visual changes. States that she has had congestion and sinus pain x 10 days.        Emily Gama is a 84 y.o. adult with a PMH of A-fib, hypertension, hyperlipidemia who presents with left sided facial pain.  Pain is sharp, shooting, lasts less than a  minute.  Not able to describe frequency, no aggravating or relieving factors.  Patient stated she had a bad cold 10 days ago and noticed pain 2 to 3 days after and has been putting off to see a doctor since then.  She had excruciating pain last night that brought her to the ED this morning.  Denies fever, chills, visual changes, weakness, difficulty swallowing.    In the ED, ED provider ordered MRA.  Patient has pacemaker and MRA cannot be performed until a.m.  Patient is admitted as FOR MRA    Review of Systems    Historical Information   Past Medical History:   Diagnosis Date    Arthritis     Last assessed: 10/9/13    Atrial fibrillation (HCC)     Cancer (HCC)     squamous cell of the face    Chronic gastric ulcer without hemorrhage and without perforation 04/20/2020    Chronic kidney disease     Colon polyps     Coronary artery disease     Gastroparesis     Glaucoma     Hyperlipidemia     Hypertension     Ischemic colitis (HCC) 11/2013    Macular degeneration     Pacemaker      Past Surgical History:   Procedure Laterality Date    BREAST BIOPSY      biopsy    CARDIAC ELECTROPHYSIOLOGY PROCEDURE N/A 05/17/2022    Procedure: Cardiac eps/aflutter ablation;  Surgeon: Carl Perez MD;  [facility]: [facility];  Service: Cardiology    CARDIAC ELECTROPHYSIOLOGY PROCEDURE N/A 05/17/2022    Procedure: CARDIAC PACER IMPLANT;  Surgeon: Carl Perez MD;  Location: [facility];  Service: Cardiology    CARDIAC ELECTROPHYSIOLOGY PROCEDURE Left 05/18/2022    Procedure: Cardiac lead revision;  Surgeon: Carl Perez MD;  Location: [facility];  Service: Cardiology    COLONOSCOPY  06/13/2013    COLONOSCOPY      June 2013 sigmoid diverticulosis and internal hemorrhoids.  Previous colonoscopy is with adenomatous polyps.    EGD  02/11/2013    EYE SURGERY Bilateral     cataract surgery    TONSILLECTOMY      UPPER GASTROINTESTINAL ENDOSCOPY      March 2021 hiatal hernia, gastric ulcer healed, minimal  gastritis.  March 2020 irregular Z-line, hiatal hernia, gastric ulcer.  Biopsies negative for H pylori or malignancy.  Biopsies negative for Leonard's.  February 2013 esophagus normal, gastritis and duodenitis, biopsies negative for H pylori.    WISDOM TOOTH EXTRACTION       Social History     Tobacco Use    Smoking status: Never    Smokeless tobacco: Never   Vaping Use    Vaping status: Never Used   Substance and Sexual Activity    Alcohol use: Yes     Comment: Social    Drug use: No    Sexual activity: Never     E-Cigarette/Vaping    E-Cigarette Use Never User      E-Cigarette/Vaping Substances    Nicotine No     THC No     CBD No     Flavoring No     Other No     Unknown No      Family History   Problem Relation Age of Onset    Hypertension Mother     Stroke Mother     Kidney disease Father     Hypertension Father     Cancer Father     Stomach cancer Father     Dialysis Father     Single kidney Father     Substance Abuse Neg Hx     Mental illness Neg Hx     Colon polyps Neg Hx     Colon cancer Neg Hx      Social History:  Marital Status:    Occupation:   Patient Pre-hospital Living Situation: Home  Patient Pre-hospital Level of Mobility: walks  Patient Pre-hospital Diet Restrictions:     Meds/Allergies   I have reviewed home medications with patient personally.  Prior to Admission medications    Medication Sig Start Date End Date Taking? Authorizing Provider   Cholecalciferol (VITAMIN D) 2000 units CAPS Take by mouth 1 daily    Historical Provider, MD   Eliquis 2.5 MG TAKE ONE TABLET BY MOUTH TWICE A DAY 10/29/24   Mohit Martínez MD   enalapril (VASOTEC) 10 mg tablet TAKE 1 TABLET BY MOUTH TWO TIMES A DAY 2/21/25   Mohit Martínez MD   flecainide (TAMBOCOR) 50 mg tablet TAKE ONE TABLET BY MOUTH TWICE A DAY 12/31/24   Mohit Martínez MD   latanoprost (XALATAN) 0.005 % ophthalmic solution 1 drop both eyes at bedtime 3/10/18   Historical Provider, MD   metoprolol succinate (TOPROL-XL) 25 mg 24 hr tablet  TAKE ONE TABLET BY MOUTH EVERY DAY 11/26/24   Mohit Martínez MD   Multiple Vitamins-Minerals (PRESERVISION AREDS 2) CAPS Take by mouth 2 (two) times a day    Historical Provider, MD   simvastatin (ZOCOR) 10 mg tablet TAKE ONE TABLET BY MOUTH DAILY AT BEDTIME 3/28/25   Mohit Martínez MD     No Known Allergies    Objective :  Temp:  [98.1 °F (36.7 °C)] 98.1 °F (36.7 °C)  HR:  [70-94] 70  BP: (147-178)/(65-75) 147/65  Resp:  [18-20] 18  SpO2:  [96 %-98 %] 98 %  O2 Device: None (Room air)    Physical Exam  Vitals and nursing note reviewed.   Constitutional:       General: He is not in acute distress.     Appearance: He is not ill-appearing.   HENT:      Head: Normocephalic.      Left Ear: Ear canal and external ear normal.      Mouth/Throat:      Mouth: Mucous membranes are moist.      Pharynx: Oropharynx is clear.   Eyes:      General: No visual field deficit.        Right eye: No discharge.         Left eye: No discharge.      Extraocular Movements: Extraocular movements intact.      Conjunctiva/sclera: Conjunctivae normal.      Pupils: Pupils are equal, round, and reactive to light.   Cardiovascular:      Rate and Rhythm: Normal rate.   Pulmonary:      Effort: Pulmonary effort is normal.      Breath sounds: Normal breath sounds.   Abdominal:      General: Bowel sounds are normal.      Palpations: Abdomen is soft.   Skin:     General: Skin is warm and dry.      Capillary Refill: Capillary refill takes 2 to 3 seconds.   Neurological:      Mental Status: He is alert.      Cranial Nerves: No cranial nerve deficit, dysarthria or facial asymmetry.              Lines/Drains:            Lab Results: I have reviewed the following results:  Results from last 7 days   Lab Units 03/31/25  1113   WBC Thousand/uL 6.21   HEMOGLOBIN g/dL 10.8*   HEMATOCRIT % 33.7*   PLATELETS Thousands/uL 217   SEGS PCT % 64   LYMPHO PCT % 24   MONO PCT % 6   EOS PCT % 5     Results from last 7 days   Lab Units 03/31/25  1113   SODIUM mmol/L 138    POTASSIUM mmol/L 4.6   CHLORIDE mmol/L 110*   CO2 mmol/L 22   BUN mg/dL 29*   CREATININE mg/dL 1.79*   ANION GAP mmol/L 6   CALCIUM mg/dL 9.6   GLUCOSE RANDOM mg/dL 115             Lab Results   Component Value Date    HGBA1C 5.5 04/24/2017           Imaging Results Review: I reviewed radiology reports from this admission including: chest xray.  Other Study Results Review: EKG was reviewed.     Administrative Statements   I have spent a total time of 75 minutes in caring for this patient on the day of the visit/encounter including Diagnostic results, Impressions, Counseling / Coordination of care, Documenting in the medical record, Reviewing/placing orders in the medical record (including tests, medications, and/or procedures), Obtaining or reviewing history  , and Communicating with other healthcare professionals .    ** Please Note: This note has been constructed using a voice recognition system. **

## 2025-03-31 NOTE — ASSESSMENT & PLAN NOTE
Heterogeneous density of thyroid with infiltrative changes and edema noted on CAT scans from 5/24  Check TSH  Check CT soft tissue neck

## 2025-04-01 ENCOUNTER — TELEPHONE (OUTPATIENT)
Age: 85
End: 2025-04-01

## 2025-04-01 VITALS
OXYGEN SATURATION: 98 % | HEIGHT: 62 IN | WEIGHT: 154.98 LBS | HEART RATE: 71 BPM | DIASTOLIC BLOOD PRESSURE: 80 MMHG | RESPIRATION RATE: 16 BRPM | SYSTOLIC BLOOD PRESSURE: 142 MMHG | BODY MASS INDEX: 28.52 KG/M2 | TEMPERATURE: 97.5 F

## 2025-04-01 PROBLEM — K02.9 DENTAL CARIES: Status: ACTIVE | Noted: 2025-04-01

## 2025-04-01 PROBLEM — E87.5 HYPERKALEMIA: Status: ACTIVE | Noted: 2025-04-01

## 2025-04-01 LAB
ANION GAP SERPL CALCULATED.3IONS-SCNC: 4 MMOL/L (ref 4–13)
BASOPHILS # BLD AUTO: 0.03 THOUSANDS/ÂΜL (ref 0–0.1)
BASOPHILS NFR BLD AUTO: 1 % (ref 0–1)
BUN SERPL-MCNC: 27 MG/DL (ref 5–25)
CALCIUM SERPL-MCNC: 9.2 MG/DL (ref 8.4–10.2)
CHLORIDE SERPL-SCNC: 112 MMOL/L (ref 96–108)
CO2 SERPL-SCNC: 21 MMOL/L (ref 21–32)
CREAT SERPL-MCNC: 1.75 MG/DL (ref 0.6–1.3)
EOSINOPHIL # BLD AUTO: 0.27 THOUSAND/ÂΜL (ref 0–0.61)
EOSINOPHIL NFR BLD AUTO: 5 % (ref 0–6)
ERYTHROCYTE [DISTWIDTH] IN BLOOD BY AUTOMATED COUNT: 13.4 % (ref 11.6–15.1)
GLUCOSE SERPL-MCNC: 87 MG/DL (ref 65–140)
HCT VFR BLD AUTO: 31.4 % (ref 36.5–46.1)
HGB BLD-MCNC: 9.7 G/DL (ref 12–15.4)
IMM GRANULOCYTES # BLD AUTO: 0.01 THOUSAND/UL (ref 0–0.2)
IMM GRANULOCYTES NFR BLD AUTO: 0 % (ref 0–2)
LYMPHOCYTES # BLD AUTO: 1.94 THOUSANDS/ÂΜL (ref 0.6–4.47)
LYMPHOCYTES NFR BLD AUTO: 37 % (ref 14–44)
MCH RBC QN AUTO: 29.5 PG (ref 26.8–34.3)
MCHC RBC AUTO-ENTMCNC: 30.9 G/DL (ref 31.4–37.4)
MCV RBC AUTO: 95 FL (ref 82–98)
MONOCYTES # BLD AUTO: 0.39 THOUSAND/ÂΜL (ref 0.17–1.22)
MONOCYTES NFR BLD AUTO: 8 % (ref 4–12)
NEUTROPHILS # BLD AUTO: 2.56 THOUSANDS/ÂΜL (ref 1.85–7.62)
NEUTS SEG NFR BLD AUTO: 49 % (ref 43–75)
NRBC BLD AUTO-RTO: 0 /100 WBCS
PLATELET # BLD AUTO: 166 THOUSANDS/UL (ref 149–390)
PMV BLD AUTO: 10.6 FL (ref 8.9–12.7)
POTASSIUM SERPL-SCNC: 5.1 MMOL/L (ref 3.5–5.3)
RBC # BLD AUTO: 3.29 MILLION/UL (ref 3.88–5.12)
SODIUM SERPL-SCNC: 137 MMOL/L (ref 135–147)
WBC # BLD AUTO: 5.2 THOUSAND/UL (ref 4.31–10.16)

## 2025-04-01 PROCEDURE — 85025 COMPLETE CBC W/AUTO DIFF WBC: CPT | Performed by: INTERNAL MEDICINE

## 2025-04-01 PROCEDURE — 99239 HOSP IP/OBS DSCHRG MGMT >30: CPT | Performed by: NURSE PRACTITIONER

## 2025-04-01 PROCEDURE — 80048 BASIC METABOLIC PNL TOTAL CA: CPT | Performed by: INTERNAL MEDICINE

## 2025-04-01 RX ORDER — GABAPENTIN 100 MG/1
100 CAPSULE ORAL 3 TIMES DAILY
Qty: 90 CAPSULE | Refills: 0 | Status: SHIPPED | OUTPATIENT
Start: 2025-04-01 | End: 2025-05-01

## 2025-04-01 RX ORDER — FLUTICASONE PROPIONATE 50 MCG
1 SPRAY, SUSPENSION (ML) NASAL DAILY
Qty: 9.9 ML | Refills: 0 | Status: SHIPPED | OUTPATIENT
Start: 2025-04-01 | End: 2025-04-15

## 2025-04-01 RX ADMIN — Medication 2000 UNITS: at 09:03

## 2025-04-01 RX ADMIN — METOPROLOL SUCCINATE 25 MG: 25 TABLET, EXTENDED RELEASE ORAL at 09:04

## 2025-04-01 RX ADMIN — FLECAINIDE ACETATE 50 MG: 100 TABLET ORAL at 09:04

## 2025-04-01 RX ADMIN — LISINOPRIL 20 MG: 20 TABLET ORAL at 09:03

## 2025-04-01 RX ADMIN — GABAPENTIN 100 MG: 100 CAPSULE ORAL at 09:03

## 2025-04-01 RX ADMIN — APIXABAN 2.5 MG: 2.5 TABLET, FILM COATED ORAL at 09:04

## 2025-04-01 NOTE — ASSESSMENT & PLAN NOTE
Urgency on admission, now improved  On enalapril PTA, substituted with lisinopril  Continue metoprolol

## 2025-04-01 NOTE — ASSESSMENT & PLAN NOTE
Lab Results   Component Value Date    EGFR 28 04/24/2024    EGFR 30 04/18/2024    EGFR 24 04/03/2024    CREATININE 1.75 (H) 04/01/2025    CREATININE 1.79 (H) 03/31/2025    CREATININE 1.60 (H) 11/26/2024   Baseline appears to be around 1.6-1.8, Follows with nephrology (Dr. Peters) as outpatient  Outpatient monitoring

## 2025-04-01 NOTE — ASSESSMENT & PLAN NOTE
Heterogeneous density of thyroid with infiltrative changes and edema noted on CAT scans from 5/24  CT soft tissue neck showed Mildly heterogeneous attenuation of the thyroid gland with left lobe nodule   TSH WNL  Will need outpatient thyroid US

## 2025-04-01 NOTE — ASSESSMENT & PLAN NOTE
Heterogeneous density of thyroid with infiltrative changes and edema noted on CAT scans from 5/24  CT soft tissue neck showed Mildly heterogeneous attenuation of the thyroid gland with left lobe nodule   TSH WNL  Will need outpatient thyroid US, d/w patient.

## 2025-04-01 NOTE — UTILIZATION REVIEW
Initial Clinical Review    Admission: Date/Time/Statement: 3/31/25 1738 observation   Admission Orders (From admission, onward)       Ordered        03/31/25 1738  Place in Observation  Once                          Orders Placed This Encounter   Procedures    Place in Observation     Standing Status:   Standing     Number of Occurrences:   1     Level of Care:   Med Surg [16]     ED Arrival Information       Expected   -    Arrival   3/31/2025 08:48    Acuity   Urgent              Means of arrival   Walk-In    Escorted by   Self    Service   Hospitalist    Admission type   Emergency              Arrival complaint   left side head pain             Chief Complaint   Patient presents with    Pain     To ED for left sided facail pain . Denies any visual changes. States that she has had congestion and sinus pain x 10 days.       Initial Presentation: 84 y.o. adult  to ED via walk in from home.    Admitted to observation with Dx: Sharp Pain, likely trigeminal neuralgia.  Presented to ED with intermittent left neck and mastoid pain starting the day prior to arrival,  on day of arrival pain worsening and radiating to left cheekbone.  Had cold about 10 days ago and pain started 2 to 3 days after it.    PMHx: atrial fibrillation on Eliquis, CAD, chronic kidney disease, hypertension, hyperlipidemia and remote squamous cell carcinoma of the face . On exam: hypertensive.  Missing several teeth.  Bun 29, creatinine 1.79.  wbc 6.21. .   Imaging shows no acute findings on ct neck.   Plan includes to check ct of facial bone.   Trial of Gabapentin for pain.  Consult neurology. .     Anticipated Length of Stay/Certification Statement: Patient will be admitted on an observation basis with an anticipated length of stay of less than 2 midnights secondary to MRA pending.       ED Treatment-Medication Administration from 03/31/2025 0847 to 03/31/2025 1913       None          Scheduled Medications:  apixaban, 2.5 mg, Oral,  BID  cholecalciferol, 2,000 Units, Oral, Daily  flecainide, 50 mg, Oral, BID  gabapentin, 100 mg, Oral, TID  latanoprost, 1 drop, Both Eyes, HS  lisinopril, 20 mg, Oral, Daily  metoprolol succinate, 25 mg, Oral, Daily  pravastatin, 20 mg, Oral, Daily With Dinner      Continuous IV Infusions:     PRN Meds:  acetaminophen, 650 mg, Oral, Q6H PRN  ondansetron, 4 mg, Intravenous, Q6H PRN      ED Triage Vitals   Temperature Pulse Respirations Blood Pressure SpO2 Pain Score   03/31/25 0909 03/31/25 0905 03/31/25 0905 03/31/25 0905 03/31/25 0905 03/31/25 0905   98.1 °F (36.7 °C) 94 20 (!) 178/75 96 % 3     Weight (last 2 days)       Date/Time Weight    03/31/25 1944 70.3 (154.98)    03/31/25 0905 71.5 (157.63)            Vital Signs (last 3 days)       Date/Time Temp Pulse Resp BP MAP (mmHg) SpO2 O2 Device Patient Position - Orthostatic VS Elisa Coma Scale Score Pain    04/01/25 07:19:37 97.5 °F (36.4 °C) 71 16 142/80 101 98 % None (Room air) Lying -- --    03/31/25 20:18:23 97.9 °F (36.6 °C) 89 -- 146/83 104 96 % -- -- -- --    03/31/25 1945 -- -- -- -- -- -- -- -- 15 No Pain    03/31/25 1944 -- 74 16 -- -- -- -- -- -- --    03/31/25 19:17:12 97.7 °F (36.5 °C) -- -- 137/87 104 -- -- -- -- --    03/31/25 1200 -- 70 18 147/65 94 98 % None (Room air) Sitting -- --    03/31/25 1103 -- -- -- -- -- -- -- -- -- No Pain    03/31/25 1100 -- 70 18 154/66 95 98 % None (Room air) Sitting -- --    03/31/25 1000 -- 74 18 169/72 103 98 % None (Room air) Sitting -- --    03/31/25 0952 -- -- -- -- -- -- -- -- 15 --    03/31/25 0909 98.1 °F (36.7 °C) -- -- -- -- -- -- -- -- --    03/31/25 0905 -- 94 20 178/75 -- 96 % None (Room air) Lying -- 3              Pertinent Labs/Diagnostic Test Results:   Radiology:  CT facial bones wo contrast   Final Interpretation by Tevin Fisher MD (03/31 2218)      1.  No acute abnormality or apparent evidence of soft tissue inflammation.   2.  Dental assessment is limited due to streak artifact from prior  "dental work. Suspect carious right third mandibular molar tooth. No periapical disease.   3.  Bilateral maxillary and left ethmoidal sinus disease. No fluid level.               Workstation performed: [id number]         CT soft tissue neck wo contrast   Final Interpretation by Tevin Fisher MD (03/31 2222)      1.  Somewhat limited assessment without IV contrast. No apparent acute inflammatory process in the neck.      2.  Mildly heterogeneous attenuation of the thyroid gland with left lobe nodule, better evaluated in prior dedicated thyroid ultrasound.         Workstation performed: [id number]         XR follow up   Final Interpretation by Edmund Ross MD (04/01 0801)      No acute cardiopulmonary disease.            Workstation performed: [id number]         MRI ED order    (Results Pending)     Cardiology:  ECG 12 lead    by Interface, Ris Results In (03/31 2013)        GI:  No orders to display           Results from last 7 days   Lab Units 04/01/25  0357 03/31/25  1113   WBC Thousand/uL 5.20 6.21   HEMOGLOBIN g/dL 9.7* 10.8*   HEMATOCRIT % 31.4* 33.7*   PLATELETS Thousands/uL 166 217   TOTAL NEUT ABS Thousands/µL 2.56 4.05         Results from last 7 days   Lab Units 04/01/25  0357 03/31/25  1113   SODIUM mmol/L 137 138   POTASSIUM mmol/L 5.1 4.6   CHLORIDE mmol/L 112* 110*   CO2 mmol/L 21 22   ANION GAP mmol/L 4 6   BUN mg/dL 27* 29*   CREATININE mg/dL 1.75* 1.79*   CALCIUM mg/dL 9.2 9.6             Results from last 7 days   Lab Units 04/01/25  0357 03/31/25  1113   GLUCOSE RANDOM mg/dL 87 115             No results found for: \"BETA-HYDROXYBUTYRATE\"                               Results from last 7 days   Lab Units 03/31/25  1113   TSH 3RD GENERATON uIU/mL 2.617                                             Results from last 7 days   Lab Units 03/31/25  1113   SED RATE mm/hour 18                                                               Past Medical History:   Diagnosis Date    Arthritis     Last assessed: " 10/9/13    Atrial fibrillation (HCC)     Cancer (HCC)     squamous cell of the face    Chronic gastric ulcer without hemorrhage and without perforation 04/20/2020    Chronic kidney disease     Colon polyps     Coronary artery disease     Gastroparesis     Glaucoma     Hyperlipidemia     Hypertension     Ischemic colitis (HCC) 11/2013    Macular degeneration     Pacemaker      Present on Admission:   Atrial fibrillation (HCC)   Hyperlipidemia   Hypertension   Chronic renal disease, stage IV (HCC)   TMJ arthralgia      Admitting Diagnosis: Pain [R52]  Pacemaker [Z95.0]  Encounter for imaging to screen for metal prior to MRI [Z01.89]  Facial pain, acute [R51.9]  Sharp pain [R52]  Age/Sex: 84 y.o. adult    Network Utilization Review Department  ATTENTION: Please call with any questions or concerns to [phone] and carefully listen to the prompts so that you are directed to the right person. All voicemails are confidential.   For Discharge needs, contact Care Management DC Support Team at [phone] opt. 2  Send all requests for admission clinical reviews, approved or denied determinations and any other requests to dedicated fax number below belonging to the [facility] where the patient is receiving treatment. List of dedicated fax numbers for the Facilities:  FACILITY NAME UR FAX NUMBER   ADMISSION DENIALS (Administrative/Medical Necessity) [phone]   DISCHARGE SUPPORT TEAM (NETWORK) [phone]   PARENT CHILD HEALTH (Maternity/NICU/Pediatrics) [phone]   [facility] [phone]   [facility] [phone]   [facility] [phone]   [facility] [phone]   Critical access hospital [phone]   [facility] [phone]   [facility] [phone]   [facility] [phone]   [facility]  HEART [place] [phone]   [facility] [phone]   [facility] [phone]   [facility] [phone]

## 2025-04-01 NOTE — DISCHARGE INSTR - AVS FIRST PAGE
"Chrystal,    Follow up with your family doctor in 1 week for posthospital visit.  Continues to take gabapentin as prescribed.  You should follow-up with neurology outpatient to see how you are doing on the medication.    As we discussed in the hospital, you should have a thyroid ultrasound done to further evaluate the nodule that we saw on your CAT scan.  This is nonurgent.  Your thyroid blood work was normal.  You should also have a routine dental exam given concern for some cavities on the right side.    Thank you,    ELSIE Ta  [facility] Internal Medicine                          Trigeminal neuralgia    The Basics  Written by the doctors and editors at [facility]  What is trigeminal neuralgia?  Trigeminal neuralgia, or \"TN,\" is a condition that causes sudden and severe pain in parts of the face.  TN is caused by a problem with the trigeminal nerve, which runs from the brain to the face. This can happen when the nerve gets irritated. The most common cause is pressure on the nerve from a nearby blood vessel. Less often, it can be related to other things, like a tumor in the brain or inflammation that damages the nerve. Sometimes, the cause of TN cannot be found.  What are the symptoms of TN?  TN causes attacks of sharp and stabbing pain in the cheek, lower face, or around the eye. The pain lasts a few seconds to a few minutes, and usually happens on only 1 side of the face. The attacks can happen over and over again.  Often, certain movements or activities make the pain attacks happen. These can include:  ? Touching the face  ? Chewing  ? Talking  ? Brushing the teeth  ? Smiling or frowning  ? Cold air on the face  TN can also cause muscle spasms in the face, along with pain.  Will I need tests?  Yes. Your doctor or nurse should be able to tell if you have TN by learning about your symptoms and doing an exam. But they will likely also do tests to learn more about your TN or what's causing it. These tests can " include an MRI or CT scan of your brain. These are imaging tests that create pictures of the brain.  How is TN treated?  TN is usually treated with medicine. Doctors use different types of medicines to treat TN. Most often, they prescribe a type of medicine normally used to prevent seizures. The most used common one is carbamazepine (sample brand names: Carbatrol, Tegretol). There are several others, too. They quiet the nerve signals that cause pain in TN.  For most people, the medicine helps reduce their number of TN attacks and makes their pain less severe. But if medicines don't help enough or cause too many side effects, your doctor might talk with you about other treatment options. These include different types of surgical procedures that quiet the nerve and make it less likely to fire. These treatments might help with symptoms, but side effects sometimes happen, including numbness or pain in the face.  All topics are updated as new evidence becomes available and our peer review process is complete.  This topic retrieved from [employer] on: Mar 30, 2025.  Topic 77473 Version 11.0  Release: 33.2.2 - C33.87  © 2025 Up[employer], [employer] and/or its affiliates. All rights reserved.

## 2025-04-01 NOTE — NURSING NOTE
RN reviewed AVS instructions with Patient and Patient verbalized understanding of all AVS instructions and denied any further question. RN walked patient to ER parking lot to  her car.

## 2025-04-01 NOTE — PLAN OF CARE
Problem: Potential for Falls  Goal: Patient will remain free of falls  Description: INTERVENTIONS:- Educate patient/family on patient safety including physical limitations- Instruct patient to call for assistance with activity - Consult OT/PT to assist with strengthening/mobility - Keep Call bell within reach- Keep bed low and locked with side rails adjusted as appropriate- Keep care items and personal belongings within reach- Initiate and maintain comfort rounds- Make Fall Risk Sign visible to staff- Offer Toileting every  Hours, in advance of need- Initiate/Maintain alarm- Obtain necessary fall risk management equipme- Apply yellow socks and bracelet for high fall risk patients- Consider moving patient to room near nurses station  Outcome: Progressing     Problem: PAIN - ADULT  Goal: Verbalizes/displays adequate comfort level or baseline comfort level  Description: Interventions:- Encourage patient to monitor pain and request assistance- Assess pain using appropriate pain scale- Administer analgesics based on type and severity of pain and evaluate response- Implement non-pharmacological measures as appropriate and evaluate response- Consider cultural and social influences on pain and pain management- Notify physician/advanced practitioner if interventions unsuccessful or patient reports new pain  Outcome: Progressing     Problem: DISCHARGE PLANNING  Goal: Discharge to home or other facility with appropriate resources  Description: INTERVENTIONS:- Identify barriers to discharge w/patient and caregiver- Arrange for needed discharge resources and transportation as appropriate- Identify discharge learning needs (meds, wound care, etc.)- Arrange for interpretive services to assist at discharge as needed- Refer to Case Management Department for coordinating discharge planning if the patient needs post-hospital services based on physician/advanced practitioner order or complex needs related to functional status,  cognitive ability, or social support system  Outcome: Progressing     Problem: Knowledge Deficit  Goal: Patient/family/caregiver demonstrates understanding of disease process, treatment plan, medications, and discharge instructions  Description: Complete learning assessment and assess knowledge base.Interventions:- Provide teaching at level of understanding- Provide teaching via preferred learning methods  Outcome: Progressing     Problem: NEUROSENSORY - ADULT  Goal: Achieves stable or improved neurological status  Description: INTERVENTIONS- Monitor and report changes in neurological status- Monitor vital signs such as temperature, blood pressure, glucose, and any other labs ordered - Initiate measures to prevent increased intracranial pressure- Monitor for seizure activity and implement precautions if appropriate    Outcome: Progressing  Goal: Remains free of injury related to seizures activity  Description: INTERVENTIONS- Maintain airway, patient safety  and administer oxygen as ordered- Monitor patient for seizure activity, document and report duration and description of seizure to physician/advanced practitioner- If seizure occurs,  ensure patient safety during seizure- Reorient patient post seizure- Seizure pads on all 4 side rails- Instruct patient/family to notify RN of any seizure activity including if an aura is experienced- Instruct patient/family to call for assistance with activity based on nursing assessment- Administer anti-seizure medications if ordered  Outcome: Progressing  Goal: Achieves maximal functionality and self care  Description: INTERVENTIONS- Monitor swallowing and airway patency with patient fatigue and changes in neurological status- Encourage and assist patient to increase activity and self care. - Encourage visually impaired, hearing impaired and aphasic patients to use assistive/communication devices  Outcome: Progressing

## 2025-04-01 NOTE — INCIDENTAL FINDINGS
The following findings require follow up:  Radiographic finding   Finding: Thyroid nodule   Follow up required: thyroid ultrasound   Follow up should be done within 1 month(s)    Please notify the following clinician to assist with the follow up:   Dr. Villatoro    Incidental finding results were discussed with the Patient by ELSIE Connolly on 04/01/25.   They expressed understanding and all questions answered.

## 2025-04-01 NOTE — ASSESSMENT & PLAN NOTE
Lab Results   Component Value Date    EGFR 28 04/24/2024    EGFR 30 04/18/2024    EGFR 24 04/03/2024    CREATININE 1.75 (H) 04/01/2025    CREATININE 1.79 (H) 03/31/2025    CREATININE 1.60 (H) 11/26/2024   Baseline appears to be around 1.6-1.8, Follows with nephrology (Dr. Peters) as outpatient  Avoid nephrotoxins and hypotension  Monitor

## 2025-04-01 NOTE — ASSESSMENT & PLAN NOTE
Presenting with sharp shooting pain left side behind the ear, suspect trigeminal neuralgia.   CT facial bones without acute abnormality or soft tissue inflammation. B/L maxillary and left ethmoidal sinus disease.   CT neck soft tissue no acute findings  Started on Gabapentin, continue  Neurology consult pending

## 2025-04-01 NOTE — ASSESSMENT & PLAN NOTE
Presenting with sharp shooting pain left side behind the ear, suspect trigeminal neuralgia.   CT facial bones without acute abnormality or soft tissue inflammation. B/L maxillary and left ethmoidal sinus disease.   CT neck soft tissue no acute findings  Started on Gabapentin, now with resolution of symptoms.  ED provider had ordered MRA to rule out dissection or overall low suspicion for this, especially given resolution of symptoms on gabapentin.  Will hold off on MRA.   Can follow-up with neurology as outpatient

## 2025-04-01 NOTE — PLAN OF CARE
Problem: PAIN - ADULT  Goal: Verbalizes/displays adequate comfort level or baseline comfort level  Description: Interventions:- Encourage patient to monitor pain and request assistance- Assess pain using appropriate pain scale- Administer analgesics based on type and severity of pain and evaluate response- Implement non-pharmacological measures as appropriate and evaluate response- Consider cultural and social influences on pain and pain management- Notify physician/advanced practitioner if interventions unsuccessful or patient reports new pain  Outcome: Progressing     Problem: DISCHARGE PLANNING  Goal: Discharge to home or other facility with appropriate resources  Description: INTERVENTIONS:- Identify barriers to discharge w/patient and caregiver- Arrange for needed discharge resources and transportation as appropriate- Identify discharge learning needs (meds, wound care, etc.)- Arrange for interpretive services to assist at discharge as needed- Refer to Case Management Department for coordinating discharge planning if the patient needs post-hospital services based on physician/advanced practitioner order or complex needs related to functional status, cognitive ability, or social support system  Outcome: Progressing     Problem: Knowledge Deficit  Goal: Patient/family/caregiver demonstrates understanding of disease process, treatment plan, medications, and discharge instructions  Description: Complete learning assessment and assess knowledge base.Interventions:- Provide teaching at level of understanding- Provide teaching via preferred learning methods  Outcome: Progressing     Problem: NEUROSENSORY - ADULT  Goal: Achieves stable or improved neurological status  Description: INTERVENTIONS- Monitor and report changes in neurological status- Monitor vital signs such as temperature, blood pressure, glucose, and any other labs ordered - Initiate measures to prevent increased intracranial pressure- Monitor for  seizure activity and implement precautions if appropriate    Outcome: Progressing  Goal: Remains free of injury related to seizures activity  Description: INTERVENTIONS- Maintain airway, patient safety  and administer oxygen as ordered- Monitor patient for seizure activity, document and report duration and description of seizure to physician/advanced practitioner- If seizure occurs,  ensure patient safety during seizure- Reorient patient post seizure- Seizure pads on all 4 side rails- Instruct patient/family to notify RN of any seizure activity including if an aura is experienced- Instruct patient/family to call for assistance with activity based on nursing assessment- Administer anti-seizure medications if ordered  Outcome: Progressing  Goal: Achieves maximal functionality and self care  Description: INTERVENTIONS- Monitor swallowing and airway patency with patient fatigue and changes in neurological status- Encourage and assist patient to increase activity and self care. - Encourage visually impaired, hearing impaired and aphasic patients to use assistive/communication devices  Outcome: Progressing

## 2025-04-01 NOTE — TELEPHONE ENCOUNTER
STILL ADMITTED:3/31/2025 - present (1 day)  [facility]        1ST ATTEMPT,     VIA [employer]     Thank you,     Ashli       [facility]/ [facility]/     ----- Message from ELSIE Isaacs sent at 4/1/2025  9:55 AM EDT -----  Regarding: HFEmily Chan will need follow-up in in 6 weeks with general neurology team for Other= ATTENDING  in 60 minute appointment. They will not require outpatient neurological testing.     Thank you,   Darby ZIMMERMAN

## 2025-04-01 NOTE — ASSESSMENT & PLAN NOTE
As per  CT facial bones, Dental assessment is limited due to streak artifact from prior dental work. Suspect carious right third mandibular molar tooth. No periapical disease.   Recommend outpatient dental evaluation. D/w patient.

## 2025-04-01 NOTE — ASSESSMENT & PLAN NOTE
As per  CT facial bones, Dental assessment is limited due to streak artifact from prior dental work. Suspect carious right third mandibular molar tooth. No periapical disease.   Recommend outpatient dental evaluation

## 2025-04-01 NOTE — DISCHARGE SUMMARY
Discharge Summary - Hospitalist   Name: Emily Gama 84 y.o. adult I MRN: [id number]  Unit/Bed#: -01 I Date of Admission: 3/31/2025   Date of Service: 4/1/2025 I Hospital Day: 0     Assessment & Plan  Sharp pain  Presenting with sharp shooting pain left side behind the ear, suspect trigeminal neuralgia.   CT facial bones without acute abnormality or soft tissue inflammation. B/L maxillary and left ethmoidal sinus disease.   CT neck soft tissue no acute findings  Started on Gabapentin, now with resolution of symptoms.  ED provider had ordered MRA to rule out dissection or overall low suspicion for this, especially given resolution of symptoms on gabapentin.  Will hold off on MRA.   Can follow-up with neurology as outpatient  Atrial fibrillation (HCC)  Heart rate appears to be controlled  S/P PPM  Continue home medications flecainide, metoprolol    Hyperlipidemia  Resume PTA crestor on discharge   Hypertension  Urgency on admission, now improved  Continue PTA enalapril and metoprolol  Chronic renal disease, stage IV ([facility])  Lab Results   Component Value Date    EGFR 28 04/24/2024    EGFR 30 04/18/2024    EGFR 24 04/03/2024    CREATININE 1.75 (H) 04/01/2025    CREATININE 1.79 (H) 03/31/2025    CREATININE 1.60 (H) 11/26/2024   Baseline appears to be around 1.6-1.8, Follows with nephrology (Dr. Peters) as outpatient  Outpatient monitoring   TMJ arthralgia  History of arthralgia noted on chart    Thyroiditis  Heterogeneous density of thyroid with infiltrative changes and edema noted on CAT scans from 5/24  CT soft tissue neck showed Mildly heterogeneous attenuation of the thyroid gland with left lobe nodule   TSH WNL  Will need outpatient thyroid US, d/w patient.   Hyperkalemia  Borderline at 5.1  Patient is on ACEI, continue for now  Outpatient monitoring, BMP in 1 week  Anemia  In setting of CKD  Chronic, stable.   Dental caries  As per  CT facial bones, Dental assessment is limited due to streak artifact from  prior dental work. Suspect carious right third mandibular molar tooth. No periapical disease.   Recommend outpatient dental evaluation. D/w patient.      Medical Problems       Resolved Problems  Date Reviewed: 1/22/2025   None       Discharging Physician / Practitioner: ELSIE Connolly  PCP: Shasha Villatoro DO  Admission Date:   Admission Orders (From admission, onward)       Ordered        03/31/25 1738  Place in Observation  Once                          Discharge Date: 04/01/25    Consultations During Hospital Stay:  none    Procedures Performed:   none    Significant Findings / Test Results:   CT facial bones No acute abnormality or apparent evidence of soft tissue inflammation. Dental assessment is limited due to streak artifact from prior dental work. Suspect carious right third mandibular molar tooth. No periapical disease. Bilateral maxillary and left ethmoidal sinus disease. No fluid level.   CT soft tissue neck without contrast Somewhat limited assessment without IV contrast. No apparent acute inflammatory process in the neck. Mildly heterogeneous attenuation of the thyroid gland with left lobe nodule, better evaluated in prior dedicated thyroid ultrasound.   TSH WNL  Sed rate 18  CBC with stable anemia  BMP with stable renal function within baseline    Incidental Findings:   Mildly heterogeneous attenuation of the thyroid gland with left lobe nodule, better evaluated in prior dedicated thyroid ultrasound.    I reviewed the above mentioned incidental findings with the patient and/or family and they expressed understanding.    Test Results Pending at Discharge (will require follow up):   none     Outpatient Tests Requested:  F/u with PCP within one week  BMP in one week  Thyroid US as above  Outpatient f/u with neurology     Complications:  none    Reason for Admission: facial pain    Hospital Course:   Emily Gama is a 84 y.o. adult patient who originally presented to the hospital on 3/31/2025 due to  "sharp/shooting, left-sided facial pain and aids from behind her ear.  Patient states that symptoms have been intermittent but gradually getting worse to the point where symptoms were excruciating prompting ED presentation.  No associated visual disturbance or other neurological complaints.     Patient was started on gabapentin for suspicion of trigeminal neuralgia.  Symptoms have completely resolved and she is overall doing well.  There was some discussion about possibly obtaining MRA to rule out dissection however overall low suspicion for this and will hold off.    Discussed incidental finding of thyroid nodule with patient, informed her of recommended outpatient thyroid ultrasound.  Also advised dental evaluation given caries noted on CT imaging.    Stable for discharge home.      Please see above list of diagnoses and related plan for additional information.     Condition at Discharge: stable    Discharge Day Visit / Exam:   Subjective:  No new complaints. Feeling better. Symptoms have completely resolved.   Vitals: Blood Pressure: 142/80 (04/01/25 0719)  Pulse: 71 (04/01/25 0719)  Temperature: 97.5 °F (36.4 °C) (04/01/25 0719)  Temp Source: Temporal (04/01/25 0719)  Respirations: 16 (04/01/25 0719)  Height: 5' 2\" (157.5 cm) (03/31/25 1944)  Weight - Scale: 70.3 kg (154 lb 15.7 oz) (03/31/25 1944)  SpO2: 98 % (04/01/25 0719)  Physical Exam  Vitals and nursing note reviewed.   Constitutional:       General: He is not in acute distress.  Cardiovascular:      Rate and Rhythm: Normal rate.   Pulmonary:      Breath sounds: Normal breath sounds.   Abdominal:      Tenderness: There is no abdominal tenderness.   Musculoskeletal:         General: No swelling.   Skin:     General: Skin is warm.   Neurological:      General: No focal deficit present.      Mental Status: He is alert and oriented to person, place, and time. Mental status is at baseline.   Psychiatric:         Mood and Affect: Mood normal.          Discussion " with Family: Patient declined call to .     Discharge instructions/Information to patient and family:   See after visit summary for information provided to patient and family.      Provisions for Follow-Up Care:  See after visit summary for information related to follow-up care and any pertinent home health orders.      Mobility at time of Discharge:   Basic Mobility Inpatient Raw Score: 24  JH-HLM Goal: 8: Walk 250 feet or more  JH-HLM Achieved: 7: Walk 25 feet or more  HLM Goal NOT achieved. Continue to encourage mobility in post discharge setting.     Disposition:   Home    Planned Readmission: no    Discharge Medications:  See after visit summary for reconciled discharge medications provided to patient and/or family.      Administrative Statements   Discharge Statement:  I have spent a total time of 45 minutes in caring for this patient on the day of the visit/encounter.    **Please Note: This note may have been constructed using a voice recognition system**

## 2025-04-02 ENCOUNTER — OFFICE VISIT (OUTPATIENT)
Dept: PODIATRY | Facility: CLINIC | Age: 85
End: 2025-04-02

## 2025-04-02 ENCOUNTER — TRANSITIONAL CARE MANAGEMENT (OUTPATIENT)
Dept: FAMILY MEDICINE CLINIC | Facility: HOSPITAL | Age: 85
End: 2025-04-02

## 2025-04-02 VITALS — WEIGHT: 154 LBS | HEIGHT: 62 IN | BODY MASS INDEX: 28.34 KG/M2

## 2025-04-02 DIAGNOSIS — E04.1 THYROID NODULE: Primary | ICD-10-CM

## 2025-04-02 DIAGNOSIS — B35.1 ONYCHOMYCOSIS: Primary | ICD-10-CM

## 2025-04-02 PROCEDURE — NCFTCARE PR NON-COVERED FOOT CARE: Performed by: PODIATRIST

## 2025-04-02 NOTE — PROGRESS NOTES
PATIENT:  Emily Gama  1940    ASSESSMENT/PLAN:  1. Onychomycosis               The patient was educated in proper foot wear.  Also discussed daily foot assessment and proper foot care.  The patient will follow up in 10 weeks.      PROCEDURE:  All mycotic toenails were reduced and debrided in length, width, and girth using a nail nipper and dremel.  Patient tolerated procedure(s) well without complications.        HPI:  Emily Gama is a 84 y.o.year old adult seen for non-covered foot care.   The patient denied any acute pedal disorder.          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    Chronic gastric ulcer without hemorrhage and without perforation 04/20/2020    Chronic kidney disease     Colon polyps     Coronary artery disease     Gastroparesis     Glaucoma     Hyperlipidemia     Hypertension     Ischemic colitis (HCC) 11/2013    Macular degeneration     Pacemaker        PAST SURGICAL HISTORY:  Past Surgical History:   Procedure Laterality Date    BREAST BIOPSY      biopsy    CARDIAC ELECTROPHYSIOLOGY PROCEDURE N/A 05/17/2022    Procedure: Cardiac eps/aflutter ablation;  Surgeon: Carl Perez MD;  Location: [facility];  Service: Cardiology    CARDIAC ELECTROPHYSIOLOGY PROCEDURE N/A 05/17/2022    Procedure: CARDIAC PACER IMPLANT;  Surgeon: Carl Perez MD;  Location: [facility];  Service: Cardiology    CARDIAC ELECTROPHYSIOLOGY PROCEDURE Left 05/18/2022    Procedure: Cardiac lead revision;  Surgeon: Carl Perez MD;  Location: [facility];  Service: Cardiology    COLONOSCOPY  06/13/2013    COLONOSCOPY      June 2013 sigmoid diverticulosis and internal hemorrhoids.  Previous colonoscopy is with adenomatous polyps.    EGD  02/11/2013    EYE SURGERY Bilateral     cataract surgery    TONSILLECTOMY      UPPER  GASTROINTESTINAL ENDOSCOPY      March 2021 hiatal hernia, gastric ulcer healed, minimal gastritis.  March 2020 irregular Z-line, hiatal hernia, gastric ulcer.  Biopsies negative for H pylori or malignancy.  Biopsies negative for Leonard's.  February 2013 esophagus normal, gastritis and duodenitis, biopsies negative for H pylori.    WISDOM TOOTH EXTRACTION          ALLERGIES:  Patient has no known allergies.    MEDICATIONS:  Current Outpatient Medications   Medication Sig Dispense Refill    Cholecalciferol (VITAMIN D) 2000 units CAPS Take by mouth 1 daily      Eliquis 2.5 MG TAKE ONE TABLET BY MOUTH TWICE A  tablet 2    enalapril (VASOTEC) 10 mg tablet TAKE 1 TABLET BY MOUTH TWO TIMES A  tablet 1    flecainide (TAMBOCOR) 50 mg tablet TAKE ONE TABLET BY MOUTH TWICE A  tablet 3    fluticasone (FLONASE) 50 mcg/act nasal spray 1 spray into each nostril daily for 14 days 9.9 mL 0    gabapentin (NEURONTIN) 100 mg capsule Take 1 capsule (100 mg total) by mouth 3 (three) times a day 90 capsule 0    latanoprost (XALATAN) 0.005 % ophthalmic solution 1 drop both eyes at bedtime      metoprolol succinate (TOPROL-XL) 25 mg 24 hr tablet TAKE ONE TABLET BY MOUTH EVERY DAY 90 tablet 1    Multiple Vitamins-Minerals (PRESERVISION AREDS 2) CAPS Take by mouth 2 (two) times a day      simvastatin (ZOCOR) 10 mg tablet TAKE ONE TABLET BY MOUTH DAILY AT BEDTIME 30 tablet 0     No current facility-administered medications for this visit.       SOCIAL HISTORY:  Social History     Socioeconomic History    Marital status:      Spouse name: None    Number of children: None    Years of education: None    Highest education level: None   Occupational History    None   Tobacco Use    Smoking status: Never    Smokeless tobacco: Never   Vaping Use    Vaping status: Never Used   Substance and Sexual Activity    Alcohol use: Yes     Comment: Social    Drug use: No    Sexual activity: Never   Other Topics Concern    None    Social History Narrative    Active advance directive    Exercise habits---walks and does occasional stepper at home    Living alone    Living situation: supportive and safe    Sees dentist occasionally     Social Drivers of Health     Financial Resource Strain: Low Risk  (2023)    Overall Financial Resource Strain (CARDIA)     Difficulty of Paying Living Expenses: Not hard at all   Food Insecurity: No Food Insecurity (2025)    Nursing - Inadequate Food Risk Classification     Worried About Running Out of Food in the Last Year: Never true     Ran Out of Food in the Last Year: Never true     Ran Out of Food in the Last Year: Never true   Transportation Needs: No Transportation Needs (2025)    Nursing - Transportation Risk Classification     Lack of Transportation: Not on file     Lack of Transportation: No   Physical Activity: Insufficiently Active (2/10/2021)    Exercise Vital Sign     Days of Exercise per Week: 3 days     Minutes of Exercise per Session: 30 min   Stress: Stress Concern Present (2/10/2021)    [employer] - Occupational Stress Questionnaire     Feeling of Stress : To some extent   Social Connections: Not on file   Intimate Partner Violence: Unknown (2025)    Nursing IPS     Feels Physically and Emotionally Safe: Not on file     Physically Hurt by Someone: Not on file     Humiliated or Emotionally Abused by Someone: Not on file     Physically Hurt by Someone: No     Hurt or Threatened by Someone: No   Housing Stability: Unknown (2025)    Nursing: Inadequate Housing Risk Classification     Has Housing: Not on file     Worried About Losing Housing: Not on file     Unable to Get Utilities: Not on file     Unable to Pay for Housing in the Last Year: No     Has Housin        REVIEW OF SYSTEMS:  GENERAL: No fever or chills  HEART: No chest pain, or palpitation  RESPIRATORY:  No acute SOB or cough  GI: No Nausea, vomit or diarrhea  NEUROLOGIC: No  "syncope or acute weakness    PHYSICAL EXAM:    Ht 5' 2\" (1.575 m) Comment: stated  Wt 69.9 kg (154 lb)   LMP  (LMP Unknown)   BMI 28.17 kg/m²     VASCULAR EXAM  Pedal pulses are intact.  CRT is WNL.     NEUROLOGIC EXAM  Sensation is intact to light touch.  No focal neurologic deficit.          DERMATOLOGIC EXAM:   No ulcer or cellulitis noted.  The patient has hypertrophic toenails X 6 with discoloration, onycholysis, and subungal debris.      MUSCULOSKELETAL EXAM:   No acute joint pain, edema, or redness.  No acute musculoskeletal problem.                  "

## 2025-04-04 ENCOUNTER — OFFICE VISIT (OUTPATIENT)
Dept: FAMILY MEDICINE CLINIC | Facility: HOSPITAL | Age: 85
End: 2025-04-04
Payer: COMMERCIAL

## 2025-04-04 VITALS
DIASTOLIC BLOOD PRESSURE: 78 MMHG | OXYGEN SATURATION: 99 % | HEIGHT: 62 IN | SYSTOLIC BLOOD PRESSURE: 128 MMHG | WEIGHT: 155 LBS | HEART RATE: 84 BPM | BODY MASS INDEX: 28.52 KG/M2

## 2025-04-04 DIAGNOSIS — E04.1 NODULE OF LEFT LOBE OF THYROID GLAND: Primary | ICD-10-CM

## 2025-04-04 DIAGNOSIS — Z95.0 HX OF CARDIAC PACEMAKER: ICD-10-CM

## 2025-04-04 DIAGNOSIS — R29.6 FALLS: ICD-10-CM

## 2025-04-04 DIAGNOSIS — J01.00 ACUTE NON-RECURRENT MAXILLARY SINUSITIS: ICD-10-CM

## 2025-04-04 LAB
ATRIAL RATE: 70 BPM
P AXIS: 59 DEGREES
PR INTERVAL: 280 MS
QRS AXIS: 41 DEGREES
QRSD INTERVAL: 102 MS
QT INTERVAL: 400 MS
QTC INTERVAL: 432 MS
T WAVE AXIS: 34 DEGREES
VENTRICULAR RATE: 70 BPM

## 2025-04-04 PROCEDURE — 93010 ELECTROCARDIOGRAM REPORT: CPT | Performed by: INTERNAL MEDICINE

## 2025-04-04 PROCEDURE — 99496 TRANSJ CARE MGMT HIGH F2F 7D: CPT | Performed by: INTERNAL MEDICINE

## 2025-04-04 RX ORDER — CEFUROXIME AXETIL 250 MG/1
250 TABLET ORAL EVERY 12 HOURS SCHEDULED
Qty: 14 TABLET | Refills: 0 | Status: SHIPPED | OUTPATIENT
Start: 2025-04-04 | End: 2025-04-11

## 2025-04-04 NOTE — ASSESSMENT & PLAN NOTE
Had evidence of thyroididitis on ct done April 2024- was seen by ENT- Dr. Coelho  at that time   Ct done for sinus during this stay with left thyroid nodule- will order  us of thyroid and  also  endocrinology consult   No swallowing issues  Orders:  •  US thyroid; Future  •  Ambulatory Referral to Endocrinology; Future

## 2025-04-04 NOTE — PROGRESS NOTES
Transition of Care Visit:  Name: Emily Gama      : 1940      MRN: [id number]  Encounter Provider: Shasha Villatoro DO  Encounter Date: 2025   Encounter department: [facility] PRIMARY CARE [address]    Assessment & Plan  Nodule of left lobe of thyroid gland  Had evidence of thyroididitis on ct done 2024- was seen by ENT- Dr. Coelho  at that time   Ct done for sinus during this stay with left thyroid nodule- will order  us of thyroid and  also  endocrinology consult   No swallowing issues  Orders:  •  US thyroid; Future  •  Ambulatory Referral to Endocrinology; Future    Hx of cardiac pacemaker         Acute non-recurrent maxillary sinusitis    Orders:  •  cefuroxime (CEFTIN) 250 mg tablet; Take 1 tablet (250 mg total) by mouth every 12 (twelve) hours for 7 days    Falls    Orders:  •  Ambulatory referral to Physical Therapy; Future      Falls Plan of Care: referral to physical therapy. Assessed feet and footwear.         History of Present Illness     Transitional Care Management Review:   Emily Gama is a 84 y.o. adult here for TCM follow up.     During the TCM phone call patient stated:  TCM Call (since 3/21/2025)     Date and time call was made  2025  3:20 PM    Patient was hospitialized at  [facility]    Date of Admission  25    Date of discharge  25    Diagnosis  sharp pain    Disposition  Home    Were the patients medications reviewed and updated  Yes      TCM Call (since 3/21/2025)     Post hospital issues  None    Scheduled for follow up?  Yes    Did you obtain your prescribed medications  Yes    Do you need help managing your prescriptions or medications  No    Is transportation to your appointment needed  No    I have advised the patient to call PCP with any new or worsening symptoms  Sapna Spencer MA    Living Arrangements  Alone        Had 3 weeks of symptoms of thick mucous and yellow in coloration- After feeling weak went to ED and felt off  "balance on 3/31/25- they had her stay overnight and had  mri ordered- ct showed  left thyroid nodule and also the maxillary and ethmoid sinusitis- they put on nasal spray and gabapentin and sent home.   She had  episode of  soft tissue neck inflammation in April 2024 found on Ct and seen by ent - was given us order but not done- she also had evidence of thyroiditis but had not been seen by endocrinology.   Had fall a few months ago- will have her do PT as she still feels some balance issues and leg weakness         Review of Systems   Constitutional:  Positive for fatigue. Negative for fever.   Respiratory:  Negative for chest tightness and shortness of breath.    Musculoskeletal:         Leg weakness- had bars placed in shower.   Does ok climbing stairs but is cautious going down   All other systems reviewed and are negative.    Objective   /78   Pulse 84   Ht 5' 2\" (1.575 m)   Wt 70.3 kg (155 lb)   LMP  (LMP Unknown)   SpO2 99%   BMI 28.35 kg/m²     Physical Exam  Vitals and nursing note reviewed.   Constitutional:       General: He is not in acute distress.  HENT:      Head: Normocephalic.      Right Ear: Tympanic membrane normal. There is no impacted cerumen.      Left Ear: There is no impacted cerumen.      Ears:      Comments: Air fluid on left tm     Nose: Congestion present.      Mouth/Throat:      Comments: Thick post nasal drip  Eyes:      General:         Right eye: No discharge.      Extraocular Movements: Extraocular movements intact.      Pupils: Pupils are equal, round, and reactive to light.   Cardiovascular:      Rate and Rhythm: Normal rate and regular rhythm.      Heart sounds: No murmur heard.  Pulmonary:      Breath sounds: No wheezing or rhonchi.   Abdominal:      General: There is no distension.      Palpations: Abdomen is soft.      Tenderness: There is no abdominal tenderness.   Musculoskeletal:         General: No swelling or tenderness.      Cervical back: Normal range of " motion.      Right lower leg: No edema.      Left lower leg: No edema.   Skin:     Findings: No rash.   Neurological:      Mental Status: He is alert and oriented to person, place, and time.      Comments: Mild leg generalized weakness   Psychiatric:         Mood and Affect: Mood normal.         Thought Content: Thought content normal.         Judgment: Judgment normal.       Medications have been reviewed by provider in current encounter

## 2025-04-04 NOTE — ASSESSMENT & PLAN NOTE
Orders:  •  cefuroxime (CEFTIN) 250 mg tablet; Take 1 tablet (250 mg total) by mouth every 12 (twelve) hours for 7 days

## 2025-04-08 NOTE — TELEPHONE ENCOUNTER
DC- HOME- 4/1/2025  PATIENT  WAS NOT CONSULTED BY NEUROLOGY JUST ADVISED OF SCHEDULING INSTRUCTIONS     Called patient and spoke with her regarding NEW Patient declined to schedule. I did advise her that she can schedule AT ANY OTHER TIME TO PLEASE CALL US BACK IF ADVISED TO SEE A NEUROLOGIST. WARM TRANSFERRED TO [facility] AS REQUESTED BY PATIENT.        Not scheduling at this time         Thank you,      Ashli

## 2025-04-15 NOTE — PROGRESS NOTES
PT Evaluation     Today's date: 2025  Patient name: Emily Gama  : 1940  MRN: [id number]  Referring provider: Shasha Villatoro DO  Dx:   Encounter Diagnosis     ICD-10-CM    1. Falls  R29.6 Ambulatory referral to Physical Therapy          Start Time: 1500  Stop Time: 1545  Total time in clinic (min): 45 minutes    Assessment  Impairments: impaired balance, impaired physical strength, lacks appropriate home exercise program, safety issue, participation limitations and activity limitations    Assessment details: Patient is a 84 y.o. year old adult presenting to OPPT s/p diagnosis of fall.  Patient demonstrates decreased strength and balance  Pt ambulates at gait speed of .77 m/s with no AD, classifying them as a limited community ambulator. Upon MMT they demo BLE weakness. 6MWT reveals they are on par with their age matched norms demos good activity tolerance. FGA/MiniBest shows they are at risk for falls scoring, 18/30. Given written HEP with focus on balance. She will benefit from skilled PT interventions to maximize return to PLOF and independence as able. With greater focus  uneven surfaces and floor transfer without support. Thank you for this referral and the ability to participate in the care of this patient.    Understanding of Dx/Px/POC: good     Prognosis: good    Goals  In 4 weeks:  Pt will be able to complete 5STS from standard arm chair with no BUE support </= 9s to demonstrate improved LE strength to assist with safety during transfers.   Pt will be able to become independent with HEP with printed handout for LE strength  and balance to promote improved activity levels outside of therapy  Pt will be able to improve FGA score >/= 21/30 to demonstrate improved balance and reduce fall risk    In 8 weeks:  Pt will be able to improve FGA score >/= 25/30 to demonstrate improved balance and to no longer be classified as a higher risk for falls.  Demonstrate consistent carryover with HEP and walking  program.  Improve gait speed by at least  0.12 m/s to meet MCID value for older adults and reduce fall risk.   Improve 6MWT by at least 190ft with least restrictive device to demonstrate improvement in ability in community ambulation.  Improve ABC to at least above 80% to show reduced risk of falling and improved balance confidence.  Pt will be able to complete floor transfer without UE support to improve overall IND     All data taken from [facility] Neuro Section or Rehab Measures, UDPT Normative Values sheet     LANDRY test: 46/56                                                    5 x STS Test:  MDC: 6 points                                                                       MDC: 2.3 seconds   age norms                                                                           Age Norms   60-69 year old = M: 55, F: 55                                             60-69 year old: 11.4 seconds   70-79 year old = M 54,  F: 53                                             70-79 year old: 12.6 seconds    80-89 year old = M53,   F: 50                                             80-89 year old: 14.8 seconds      TUG test:                                                                     10 Meter Walk Test:  MDC: 4.14 seconds                                                                MDC: .59 ft/sec  Cut off score for Falls                                                  Age Norms  > 13.5 seconds community dwelling adults                20-29; M: 4.56 ft/sec F: 4.62 ft/sec  > 32.2 Frail Elderly                                                     30-39: M 4.76 ft/sec  F: 4.68 ft/sec                                                                                                     40-49: M: 4.79 ft/sec  F: 4.62 ft/sec  6 Minute Walk Test                                                              50-59: M: 4.76 ft/sec  F: 4.56 ft/sec  MDC: 190 feet                                                                         60-69: M: 4.56 ft/sec  F: 4.26 ft/sec  Age Norms                                                                              70-+    M: 4.36 ft/sec  F: 4.16 ft/sec  60-69:    M: 1876 F: 1765  70-79:    M: 1729 F: 1545  80-89 +: M: 1368 F; 1286                                                                                                                                  FGA  Age matched Norms  -40-49 years= 28.9/ 50-59 years=28.4  -60-69 years=27.1/70-79 years=24.9  -80/89 years=20.8  MCID: Vestibular disorders: 8 points  MDC: Parkinsons: 0.61, Stroke: 4.2 points  Cut-offs:  -Community dwelling older adults              -22/30 : predict falls (Sensitivity 85%, Specificity 86%)              -20/30 (unexplained falls in the next 6 months) (Sensitivity 100%, Specificity 76%)  -Parkinson's              -5/30 (identify fallers in Parkinson's)        Plan  Patient would benefit from: skilled physical therapy    Planned therapy interventions: neuromuscular re-education, manual therapy, patient education, home exercise program, therapeutic exercise, therapeutic activities and gait training    Frequency: 2x week  Duration in weeks: 8  Plan of Care beginning date: 4/16/2025  Plan of Care expiration date: 6/11/2025  Treatment plan discussed with: patient        Subjective Evaluation    History of Present Illness  Mechanism of injury: IE: Pt presents to OPPT after fall that occurred about 3 months ago. She fell while walking into [employer] while stepping over a curb where she did not  her foot high enough. Pt reports that she feels comfortable with her balance as long as is she holding onto something. IND with ADLs and IADLs and denies using AD. She lives alone in a single level home. Pt reports that she has trouble getting up from the floor without holding on to anything. Also noted difficulty over uneven surfaces such as grass and uneven pavement.   Quality of life: good    Patient Goals  Patient goals for therapy:  improved balance  Patient goal: feel more secure with my walking  Pain  No pain reported    Social Support  Steps to enter house: yes  3  Stairs in house: no   Lives in: one-story house  Lives with: alone    Employment status: not working  Hand dominance: right    Treatments  Previous treatment: physical therapy        Objective     Strength/Myotome Testing     Left Hip   Planes of Motion   Flexion: 3+    Right Hip   Planes of Motion   Flexion: 4-    Left Knee   Flexion: 4+  Extension: 4+    Right Knee   Flexion: 4  Extension: 4    Left Ankle/Foot   Dorsiflexion: 5  Plantar flexion: WFL.     Right Ankle/Foot   Dorsiflexion: 5  Plantar flexion: WFL.   Neuro Exam:     Sensation   Light touch LE: left WNL and right WNL            Daily Treatment Diary     Precautions: H/o pacemaker, h/o a-fib, h/o chronic kidney dz, h/o HTN, h/o macular degeneration  CO-MORBIDITIES:  HEP ACCESS CODE: [id number]   FOTO Completed On:     POC Expires Reeval for Medicare to be completed  Unit Limit Auth Expiration Date PT/OT/STVisit Limit   6/11/25 By visit NA BOMN 12/31/25 BOMN    Completed on visit                    Auth Status DATE 4/16        NA Visit # 1         Remaining         FOM 5STS: 11.55s no BUE support    FGA: 18/30    6MWT: 1584 ft    10MWT:  .78m/s    ABC: 62.5%        MANUAL THERAPY                                                               THERAPEUTIC EXERCISE HEP: EC Semitandem 30sx3, HO+HT semitandem 30sx3                                                                                                                                                                NEUROMUSCULAR REEDUCATION           Static balance          Dynamic balance          Foam beam          Hurdles          Uneven surfaces                                                                                                    THERAPEUTIC ACTIVITY                                                  GAIT TRAINING                                                   MODALITIES

## 2025-04-16 ENCOUNTER — EVALUATION (OUTPATIENT)
Facility: CLINIC | Age: 85
End: 2025-04-16
Payer: COMMERCIAL

## 2025-04-16 DIAGNOSIS — R29.6 FALLS: ICD-10-CM

## 2025-04-16 PROCEDURE — 97162 PT EVAL MOD COMPLEX 30 MIN: CPT

## 2025-04-16 PROCEDURE — 97112 NEUROMUSCULAR REEDUCATION: CPT

## 2025-04-17 ENCOUNTER — HOSPITAL ENCOUNTER (OUTPATIENT)
Dept: ULTRASOUND IMAGING | Facility: HOSPITAL | Age: 85
End: 2025-04-17
Attending: INTERNAL MEDICINE
Payer: COMMERCIAL

## 2025-04-17 ENCOUNTER — APPOINTMENT (OUTPATIENT)
Dept: RADIOLOGY | Facility: HOSPITAL | Age: 85
End: 2025-04-17
Payer: COMMERCIAL

## 2025-04-17 DIAGNOSIS — E04.1 NODULE OF LEFT LOBE OF THYROID GLAND: ICD-10-CM

## 2025-04-17 PROCEDURE — 76536 US EXAM OF HEAD AND NECK: CPT

## 2025-04-22 NOTE — PROGRESS NOTES
"Daily Note     Today's date: 2025  Patient name: Emily Gama  : 1940  MRN: [id number]  Referring provider: Shasha Villatoro DO  Dx:   Encounter Diagnosis     ICD-10-CM    1. Falls  R29.6           Start Time: 1200  Stop Time: 1245  Total time in clinic (min): 45 minutes    Subjective: Emily reports hitting her head on the mirror of a van in the parking lot       Objective: See treatment diary below      Assessment: Emily Tolerated treatment well with appropriate muscle fatigue experienced with ex's. She  was appropriately challenged with their program. Demonstrated instability with complaint surfaces and NBOS. Required vc's t/o session for proper positioning with ex's.  She  would benefit from continued PT and compliance with HEP.        Plan: Continue per plan of care.      Daily Treatment Diary     Precautions: H/o pacemaker, h/o a-fib, h/o chronic kidney dz, h/o HTN, h/o macular degeneration  CO-MORBIDITIES:  HEP ACCESS CODE: [id number]   FOTO Completed On:     POC Expires Reeval for Medicare to be completed  Unit Limit Auth Expiration Date PT/OT/STVisit Limit   25 By visit NA BOMN 25 BOMN    Completed on visit                    Auth Status DATE        NA Visit # 1 2        Remaining         FOM 5STS: 11.55s no BUE support    FGA:     6MWT: 1584 ft    10MWT:  .78m/s    ABC: 62.5%        MANUAL THERAPY                                                               THERAPEUTIC EXERCISE HEP: EC Semitandem 30sx3, HO+HT semitandem 30sx3                                                                                                                                                                NEUROMUSCULAR REEDUCATION           Static balance          Dynamic balance   Airex FT   HT/HN 60\" ea     FT/EC  60\"       Foam beam          Hurdles   SOLO  Fwd/lat   6 lengths        Uneven surfaces     6\"step + Airex    Alt toe taps 20x     Blaze pods alt taps  Random   2 min   Hits 45     "   Dynamic GT    SOLO  HT/HN  20ft x4 ea     Half turns  20ft x 4                                                                                        THERAPEUTIC ACTIVITY                                                  GAIT TRAINING                                                  MODALITIES

## 2025-04-24 ENCOUNTER — OFFICE VISIT (OUTPATIENT)
Facility: CLINIC | Age: 85
End: 2025-04-24
Attending: INTERNAL MEDICINE
Payer: COMMERCIAL

## 2025-04-24 ENCOUNTER — RESULTS FOLLOW-UP (OUTPATIENT)
Dept: FAMILY MEDICINE CLINIC | Facility: HOSPITAL | Age: 85
End: 2025-04-24

## 2025-04-24 DIAGNOSIS — R29.6 FALLS: Primary | ICD-10-CM

## 2025-04-24 PROCEDURE — 97112 NEUROMUSCULAR REEDUCATION: CPT

## 2025-04-24 NOTE — RESULT ENCOUNTER NOTE
Please inform patient of ultrasound results showing a small nonconcerning nodule that is stable in size and does not require further evaluation.

## 2025-04-28 ENCOUNTER — OFFICE VISIT (OUTPATIENT)
Facility: CLINIC | Age: 85
End: 2025-04-28
Attending: INTERNAL MEDICINE
Payer: COMMERCIAL

## 2025-04-28 ENCOUNTER — APPOINTMENT (OUTPATIENT)
Dept: LAB | Facility: HOSPITAL | Age: 85
End: 2025-04-28
Payer: COMMERCIAL

## 2025-04-28 DIAGNOSIS — N18.4 ANEMIA DUE TO STAGE 4 CHRONIC KIDNEY DISEASE  (HCC): ICD-10-CM

## 2025-04-28 DIAGNOSIS — R29.6 FALLS: Primary | ICD-10-CM

## 2025-04-28 DIAGNOSIS — N18.4 CHRONIC RENAL DISEASE, STAGE IV (HCC): ICD-10-CM

## 2025-04-28 DIAGNOSIS — D63.1 ANEMIA DUE TO STAGE 4 CHRONIC KIDNEY DISEASE  (HCC): ICD-10-CM

## 2025-04-28 LAB
ANION GAP SERPL CALCULATED.3IONS-SCNC: 5 MMOL/L (ref 4–13)
BACTERIA UR QL AUTO: ABNORMAL /HPF
BILIRUB UR QL STRIP: NEGATIVE
BUN SERPL-MCNC: 29 MG/DL (ref 5–25)
CALCIUM SERPL-MCNC: 9.4 MG/DL (ref 8.4–10.2)
CHLORIDE SERPL-SCNC: 110 MMOL/L (ref 96–108)
CLARITY UR: CLEAR
CO2 SERPL-SCNC: 26 MMOL/L (ref 21–32)
COLOR UR: ABNORMAL
CREAT SERPL-MCNC: 1.59 MG/DL (ref 0.6–1.3)
CREAT UR-MCNC: 85.7 MG/DL
CREAT UR-MCNC: 85.7 MG/DL
GLUCOSE P FAST SERPL-MCNC: 83 MG/DL (ref 65–99)
GLUCOSE UR STRIP-MCNC: NEGATIVE MG/DL
HGB UR QL STRIP.AUTO: NEGATIVE
KETONES UR STRIP-MCNC: NEGATIVE MG/DL
LEUKOCYTE ESTERASE UR QL STRIP: NEGATIVE
MICROALBUMIN UR-MCNC: 43.1 MG/L
MICROALBUMIN/CREAT 24H UR: 50 MG/G CREATININE (ref 0–30)
NITRITE UR QL STRIP: NEGATIVE
NON-SQ EPI CELLS URNS QL MICRO: ABNORMAL /HPF
PH UR STRIP.AUTO: 6 [PH]
POTASSIUM SERPL-SCNC: 4.9 MMOL/L (ref 3.5–5.3)
PROT UR STRIP-MCNC: ABNORMAL MG/DL
PROT UR-MCNC: 19.3 MG/DL
PROT/CREAT UR: 0.2 MG/G{CREAT}
RBC #/AREA URNS AUTO: ABNORMAL /HPF
SODIUM SERPL-SCNC: 141 MMOL/L (ref 135–147)
SP GR UR STRIP.AUTO: 1.01 (ref 1–1.03)
UROBILINOGEN UR STRIP-ACNC: <2 MG/DL
WBC #/AREA URNS AUTO: ABNORMAL /HPF

## 2025-04-28 PROCEDURE — 84166 PROTEIN E-PHORESIS/URINE/CSF: CPT

## 2025-04-28 PROCEDURE — 82570 ASSAY OF URINE CREATININE: CPT

## 2025-04-28 PROCEDURE — 97112 NEUROMUSCULAR REEDUCATION: CPT

## 2025-04-28 PROCEDURE — 83521 IG LIGHT CHAINS FREE EACH: CPT

## 2025-04-28 PROCEDURE — 84156 ASSAY OF PROTEIN URINE: CPT

## 2025-04-28 PROCEDURE — 80048 BASIC METABOLIC PNL TOTAL CA: CPT

## 2025-04-28 PROCEDURE — 36415 COLL VENOUS BLD VENIPUNCTURE: CPT

## 2025-04-28 PROCEDURE — 82043 UR ALBUMIN QUANTITATIVE: CPT

## 2025-04-28 PROCEDURE — 86335 IMMUNFIX E-PHORSIS/URINE/CSF: CPT

## 2025-04-28 PROCEDURE — 81001 URINALYSIS AUTO W/SCOPE: CPT

## 2025-04-28 PROCEDURE — 84165 PROTEIN E-PHORESIS SERUM: CPT

## 2025-04-28 NOTE — PROGRESS NOTES
"Daily Note     Today's date: 2025  Patient name: Emily Gama  : 1940  MRN: [id number]  Referring provider: Shasha Villatoro DO  Dx:   Encounter Diagnosis     ICD-10-CM    1. Falls  R29.6             Start Time: 1345  Stop Time: 1430  Total time in clinic (min): 45 minutes    Subjective: Emily reports no new complaints and is agreeable to PT session      Objective: See treatment diary below      Assessment: Pt able to demo appropriate hip and ankle strategies with balance interventions. Noted difficulty with complaint surfaces evident by requiring occ Kristine for steadying or relying on reactive stepping. Frequent Vcs for forward eye gaze throughout session with poor carry over. Pt will benefit from continues skilled PT in order to maximize overall functional mobility.       Plan: Continue per plan of care.      Daily Treatment Diary     Precautions: H/o pacemaker, h/o a-fib, h/o chronic kidney dz, h/o HTN, h/o macular degeneration  CO-MORBIDITIES:  HEP ACCESS CODE: [id number]   FOTO Completed On:     POC Expires Reeval for Medicare to be completed  Unit Limit Auth Expiration Date PT/OT/STVisit Limit   25 By visit NA BOMN 25 BOMN    Completed on visit                    Auth Status DATE       NA Visit # 1 2 3       Remaining         FOM 5STS: 11.55s no BUE support    FGA:     6MWT: 1584 ft    10MWT:  .78m/s    ABC: 62.5%        MANUAL THERAPY                                                               THERAPEUTIC EXERCISE HEP: EC Semitandem 30sx3, HO+HT semitandem 30sx3                                                                                                                                                                NEUROMUSCULAR REEDUCATION           Static balance          Dynamic balance   Airex FT   HT/HN 60\" ea     FT/EC  60\"       Foam beam    SOLO lateral x3 laps      Hurdles   SOLO  Fwd/lat   6 lengths  SOLO fwd/lateral x3 laps ea over middle hurdles and foam " "pads      Uneven surfaces     6\"step + Airex    Alt toe taps 20x     Blaze pods alt taps  Random   2 min   Hits 45       Dynamic GT    SOLO  HT/HN  20ft x4 ea     Half turns  20ft x 4  20ft x2 HT/HN +tidal tank      Tandem walk    Semitandem walk 2x20ft                                                                             THERAPEUTIC ACTIVITY                                                  GAIT TRAINING                                                  MODALITIES                                        "

## 2025-04-29 LAB
KAPPA LC FREE SER-MCNC: 61.7 MG/L (ref 3.3–19.4)
KAPPA LC FREE/LAMBDA FREE SER: 1.59 {RATIO} (ref 0.26–1.65)
LAMBDA LC FREE SERPL-MCNC: 38.8 MG/L (ref 5.7–26.3)

## 2025-04-30 LAB
ALBUMIN SERPL ELPH-MCNC: 3.51 G/DL (ref 3.2–5.1)
ALBUMIN SERPL ELPH-MCNC: 57.6 % (ref 48–70)
ALPHA1 GLOB SERPL ELPH-MCNC: 0.23 G/DL (ref 0.15–0.47)
ALPHA1 GLOB SERPL ELPH-MCNC: 3.8 % (ref 1.8–7)
ALPHA2 GLOB SERPL ELPH-MCNC: 0.53 G/DL (ref 0.42–1.04)
ALPHA2 GLOB SERPL ELPH-MCNC: 8.7 % (ref 5.9–14.9)
BETA GLOB ABNORMAL SERPL ELPH-MCNC: 0.41 G/DL (ref 0.31–0.57)
BETA1 GLOB SERPL ELPH-MCNC: 6.8 % (ref 4.7–7.7)
BETA2 GLOB SERPL ELPH-MCNC: 6.2 % (ref 3.1–7.9)
BETA2+GAMMA GLOB SERPL ELPH-MCNC: 0.38 G/DL (ref 0.2–0.58)
GAMMA GLOB ABNORMAL SERPL ELPH-MCNC: 1.03 G/DL (ref 0.4–1.66)
GAMMA GLOB SERPL ELPH-MCNC: 16.9 % (ref 6.9–22.3)
IGG/ALB SER: 1.36 {RATIO} (ref 1.1–1.8)
PROT SERPL-MCNC: 6.1 G/DL (ref 6.4–8.4)

## 2025-04-30 PROCEDURE — 84165 PROTEIN E-PHORESIS SERUM: CPT | Performed by: STUDENT IN AN ORGANIZED HEALTH CARE EDUCATION/TRAINING PROGRAM

## 2025-05-01 ENCOUNTER — APPOINTMENT (OUTPATIENT)
Facility: CLINIC | Age: 85
End: 2025-05-01
Attending: INTERNAL MEDICINE
Payer: COMMERCIAL

## 2025-05-01 DIAGNOSIS — E78.5 HYPERLIPIDEMIA, UNSPECIFIED HYPERLIPIDEMIA TYPE: ICD-10-CM

## 2025-05-01 LAB
ALBUMIN UR ELPH-MCNC: 44.3 %
ALPHA1 GLOB MFR UR ELPH: 9.7 %
ALPHA2 GLOB MFR UR ELPH: 7.6 %
B-GLOBULIN MFR UR ELPH: 18.6 %
GAMMA GLOB MFR UR ELPH: 19.8 %
PROT UR-MCNC: 19.6 MG/DL

## 2025-05-01 PROCEDURE — 86335 IMMUNFIX E-PHORSIS/URINE/CSF: CPT | Performed by: STUDENT IN AN ORGANIZED HEALTH CARE EDUCATION/TRAINING PROGRAM

## 2025-05-01 PROCEDURE — 84166 PROTEIN E-PHORESIS/URINE/CSF: CPT | Performed by: STUDENT IN AN ORGANIZED HEALTH CARE EDUCATION/TRAINING PROGRAM

## 2025-05-02 RX ORDER — SIMVASTATIN 10 MG
10 TABLET ORAL
Qty: 30 TABLET | Refills: 0 | Status: SHIPPED | OUTPATIENT
Start: 2025-05-02

## 2025-05-04 PROBLEM — J01.00 ACUTE NON-RECURRENT MAXILLARY SINUSITIS: Status: RESOLVED | Noted: 2025-04-04 | Resolved: 2025-05-04

## 2025-05-06 ENCOUNTER — OFFICE VISIT (OUTPATIENT)
Facility: CLINIC | Age: 85
End: 2025-05-06
Attending: INTERNAL MEDICINE
Payer: COMMERCIAL

## 2025-05-06 DIAGNOSIS — R29.6 FALLS: Primary | ICD-10-CM

## 2025-05-06 PROCEDURE — 97112 NEUROMUSCULAR REEDUCATION: CPT

## 2025-05-06 NOTE — PROGRESS NOTES
"Daily Note     Today's date: 2025  Patient name: Emily Gama  : 1940  MRN: [id number]  Referring provider: Shasha Villatoro DO  Dx:   Encounter Diagnosis     ICD-10-CM    1. Falls  R29.6           Start Time: 1545  Stop Time: 1630  Total time in clinic (min): 45 minutes    Subjective: Emily reports no new complaints and is agreeable to PT session      Objective: See treatment diary below      Assessment: Pt able to demo appropriate reactive steps with navigating obstacles over TM. Continued difficulty with amb over complaint surfaces evident by increased reliance on wall to maintain balance. Noted reduced gait with bwd amb which improved minimally with Vcs. Pt will benefit from continues skilled PT in order to maximize overall functional mobility.       Plan: Continue per plan of care.      Daily Treatment Diary     Precautions: H/o pacemaker, h/o a-fib, h/o chronic kidney dz, h/o HTN, h/o macular degeneration  CO-MORBIDITIES:  HEP ACCESS CODE: [id number]   FOTO Completed On:     POC Expires Reeval for Medicare to be completed  Unit Limit Auth Expiration Date PT/OT/STVisit Limit   25 By visit NA BOMN 25 BOMN    Completed on visit                    Auth Status DATE      NA Visit # 1 2 3 4      Remaining         FOM 5STS: 11.55s no BUE support    FGA:     6MWT: 1584 ft    10MWT:  .78m/s    ABC: 62.5%        MANUAL THERAPY                                                               THERAPEUTIC EXERCISE HEP: EC Semitandem 30sx3, HO+HT semitandem 30sx3                                                                                                                                                                NEUROMUSCULAR REEDUCATION           Static balance          Dynamic balance   Airex FT   HT/HN 60\" ea     FT/EC  60\"  2 min x3 color catch with pods on 6\" step standing on foam      Foam beam    SOLO lateral x3 laps Lateral x2 occ Kristine     Hurdles   SOLO  Fwd/lat   6 " "lengths  SOLO fwd/lateral x3 laps ea over middle hurdles and foam pads      Uneven surfaces     6\"step + Airex    Alt toe taps 20x     Blaze pods alt taps  Random   2 min   Hits 45       Dynamic GT    SOLO  HT/HN  20ft x4 ea     Half turns  20ft x 4  20ft x2 HT/HN +tidal tank 20ft x3 HT+ bwd+tidal tank     Tandem walk    Semitandem walk 2x20ft       TM     SOLO no UE support .5mph progressed to .8mph stepping over bean bags 3 min x2                                                                  THERAPEUTIC ACTIVITY                                                  GAIT TRAINING                                                  MODALITIES                                        "

## 2025-05-08 ENCOUNTER — OFFICE VISIT (OUTPATIENT)
Facility: CLINIC | Age: 85
End: 2025-05-08
Attending: INTERNAL MEDICINE
Payer: COMMERCIAL

## 2025-05-08 DIAGNOSIS — R29.6 FALLS: Primary | ICD-10-CM

## 2025-05-08 PROCEDURE — 97112 NEUROMUSCULAR REEDUCATION: CPT

## 2025-05-08 NOTE — PROGRESS NOTES
"Daily Note     Today's date: 2025  Patient name: Emily Gama  : 1940  MRN: [id number]  Referring provider: Shasha Villatoro DO  Dx:   Encounter Diagnosis     ICD-10-CM    1. Falls  R29.6         Start Time: 1500  Stop Time: 1545  Total time in clinic (min): 45 minutes    Subjective: Emily reports no new complaints and is agreeable to PT session      Objective: See treatment diary below      Assessment: Pt required consistent Vcs throughout session for upright posture and forward eye gaze with poor carry over. Pt also required Vcs for increased speed with bwd amb for greater balance challenge. Education provided regarding updated HEP to include more dynamic balance interventions with good understanding form patient. Pt will benefit from continued skilled PT in order to maximize overall functional mobility.       Plan: Continue per plan of care.      Daily Treatment Diary     Precautions: H/o pacemaker, h/o a-fib, h/o chronic kidney dz, h/o HTN, h/o macular degeneration  CO-MORBIDITIES:  HEP ACCESS CODE: [id number]   FOTO Completed On:     POC Expires Reeval for Medicare to be completed  Unit Limit Auth Expiration Date PT/OT/STVisit Limit   25 By visit NA BOMN 25 BOMN    Completed on visit                    Auth Status DATE     NA Visit # 1 2 3 4 5     Remaining         FOM 5STS: 11.55s no BUE support    FGA:     6MWT: 1584 ft    10MWT:  .78m/s    ABC: 62.5%        MANUAL THERAPY                                                               THERAPEUTIC EXERCISE HEP: EC Semitandem 30sx3, HO+HT semitandem 30sx3                                                                                                                                                                NEUROMUSCULAR REEDUCATION           Static balance          Dynamic balance   Airex FT   HT/HN 60\" ea     FT/EC  60\"  2 min x3 color catch with pods on 6\" step standing on foam  2 min x2 color catch lateral " "steps over series of foam pads fwd/bwd toe taps    Semitanem on foam picking up cones from floor x2 rounds     Lateral steps tapping cones ~2ft apart 20ftx3     Foam beam    SOLO lateral x3 laps Lateral x2 occ Kristine     Hurdles   SOLO  Fwd/lat   6 lengths  SOLO fwd/lateral x3 laps ea over middle hurdles and foam pads      Uneven surfaces     6\"step + Airex    Alt toe taps 20x     Blaze pods alt taps  Random   2 min   Hits 45       Dynamic GT    SOLO  HT/HN  20ft x4 ea     Half turns  20ft x 4  20ft x2 HT/HN +tidal tank 20ft x3 HT+ bwd+tidal tank 20ft x4 HT+ bwd+tidal tank    Tandem walk    Semitandem walk 2x20ft   Semitandem walk 3x20ft     20ftx1 semitandem w/ ball toss    TM     SOLO no UE support .5mph progressed to .8mph stepping over bean bags 3 min x2                                                                  THERAPEUTIC ACTIVITY                                                  GAIT TRAINING                                                  MODALITIES                                        "

## 2025-05-13 ENCOUNTER — OFFICE VISIT (OUTPATIENT)
Dept: NEPHROLOGY | Facility: HOSPITAL | Age: 85
End: 2025-05-13
Payer: COMMERCIAL

## 2025-05-13 VITALS
DIASTOLIC BLOOD PRESSURE: 78 MMHG | HEART RATE: 84 BPM | WEIGHT: 157 LBS | BODY MASS INDEX: 28.89 KG/M2 | OXYGEN SATURATION: 98 % | SYSTOLIC BLOOD PRESSURE: 132 MMHG | HEIGHT: 62 IN

## 2025-05-13 DIAGNOSIS — D63.1 ANEMIA DUE TO STAGE 4 CHRONIC KIDNEY DISEASE  (HCC): ICD-10-CM

## 2025-05-13 DIAGNOSIS — N20.0 NEPHROLITHIASIS: ICD-10-CM

## 2025-05-13 DIAGNOSIS — N18.4 ANEMIA DUE TO STAGE 4 CHRONIC KIDNEY DISEASE  (HCC): ICD-10-CM

## 2025-05-13 DIAGNOSIS — E87.5 HYPERKALEMIA: ICD-10-CM

## 2025-05-13 DIAGNOSIS — E55.9 VITAMIN D DEFICIENCY: ICD-10-CM

## 2025-05-13 DIAGNOSIS — I10 PRIMARY HYPERTENSION: ICD-10-CM

## 2025-05-13 DIAGNOSIS — N18.4 CHRONIC RENAL DISEASE, STAGE IV (HCC): Primary | ICD-10-CM

## 2025-05-13 PROCEDURE — 99214 OFFICE O/P EST MOD 30 MIN: CPT | Performed by: INTERNAL MEDICINE

## 2025-05-13 NOTE — PROGRESS NOTES
NEPHROLOGY OUTPATIENT PROGRESS NOTE   Emily Gama 84 y.o. female MRN: [id number]  DATE: 5/13/2025  Reason for visit:   Chief Complaint   Patient presents with    Follow-up    Chronic Kidney Disease        Patient Instructions   Chronic renal disease, stage IV (HCC)  -Chronic kidney disease stage IV in the setting of age-related nephron loss plus or minus hypertensive nephrosclerosis  - Baseline serum creatinine 1.3-1.4 up until early 2015, baseline has risen into the mid to high ones with most recent baseline 1.6-1.8, last serum creatinine 1.59 as of 4/28/25.  This correlates with an EGFR of 25-35 L/min. Renal function remains stable.  -Renal u/s March 2023 showed b/l cortical thinning and b/l renal cysts  -last UA with trace protein, microalb:Cr 50mg/g, UpCr 0.2g(improving)  -would avoid excessive vitamin C intake as this can lead to oxalate deposition in the setting of CKD. Avoid > 250mg vitamin C/day.  -avoid nonsteroidals including ibuprofen, aleve, advil, motrin, naproxen, celebrex, indomethacin, toradol  -avoid herbal teas containing dandelion root or licorice root or rosehips  -CKD education booklet provided in office previously  -would not want dialysis if needed due to family experiences  -low risk of kidney failure at 2 years and moderate risk of kidney failure at 5 years  Primary hypertension  -blood pressure acceptable in light of patient's age and history of CKD  -avoid high sodium/salt diet  -avoid caffeine  -continue enalapril 10mg twice daily, flecainide 50mg twice daily, metoprolol 25mg daily  -goal BP < 150/90, at goal  Hyperkalemia  -K 4.9 as of 4/28/25, monitor BMP  Vitamin D deficiency  -vitamin D 29.6, on vitamin D 2000u daily  -monitor vitamin D level annually  Nephrolithiasis   - noted on renal u/s performed March 2023  -recommend increased hydration to urinate 2-2.5L/day  -avoid high salt/sodium diet   Anemia due to stage 4 chronic kidney disease  (HCC)  - Hgb 9.7 as of 4/1/25, low   -prior  iron panel showed low ferritin and low iron sat in Aug. 2020, iron 55, TIBC 315.   -Monitor CBC.  -myeloma work up negaitve    RTC in 6 months.  Obtain blood and urine testing prior to office visit in 3 months and 6 months.  Increase hydration.       Assessment & Plan  Chronic renal disease, stage IV (HCC)  -Chronic kidney disease stage IV in the setting of age-related nephron loss plus or minus hypertensive nephrosclerosis  - Baseline serum creatinine 1.3-1.4 up until early 2015, baseline has risen into the mid to high ones with most recent baseline 1.6-1.8, last serum creatinine 1.59 as of 4/28/25.  This correlates with an EGFR of 25-35 L/min. Renal function remains stable.  -Renal u/s March 2023 showed b/l cortical thinning and b/l renal cysts  -last UA with trace protein, microalb:Cr 50mg/g, UpCr 0.2g(improving)  -would avoid excessive vitamin C intake as this can lead to oxalate deposition in the setting of CKD. Avoid > 250mg vitamin C/day.  -avoid nonsteroidals including ibuprofen, aleve, advil, motrin, naproxen, celebrex, indomethacin, toradol  -avoid herbal teas containing dandelion root or licorice root or rosehips  -CKD education booklet provided in office previously  -would not want dialysis if needed due to family experiences  -low risk of kidney failure at 2 years and moderate risk of kidney failure at 5 years  Primary hypertension  -blood pressure acceptable in light of patient's age and history of CKD  -avoid high sodium/salt diet  -avoid caffeine  -continue enalapril 10mg twice daily, flecainide 50mg twice daily, metoprolol 25mg daily  -goal BP < 150/90, at goal  Hyperkalemia  -K 4.9 as of 4/28/25, monitor BMP  Vitamin D deficiency  -vitamin D 29.6, on vitamin D 2000u daily  -monitor vitamin D level annually  Nephrolithiasis   - noted on renal u/s performed March 2023  -recommend increased hydration to urinate 2-2.5L/day  -avoid high salt/sodium diet   Anemia due to stage 4 chronic kidney disease   "([facility])  - Hgb 9.7 as of 4/1/25, low   -prior iron panel showed low ferritin and low iron sat in Aug. 2020, iron 55, TIBC 315.   -Monitor CBC.  -myeloma work up negaitve    RTC in 6 months.  Obtain blood and urine testing prior to office visit in 3 months and 6 months.    SUBJECTIVE / INTERVAL HISTORY:  84 y.o. female presents in follow up of CKD.     Emily Gama s/p recent hospitalization in April 2025 for pain behind ear, likely trigeminal neuralgia. Had fallen. She was given gabapentin. Symptoms resolved.     Denies NSAID use.    Review of Systems   Constitutional:  Positive for fatigue. Negative for chills and fever.   HENT:  Negative for sore throat and trouble swallowing.    Eyes:  Negative for visual disturbance.   Respiratory:  Negative for cough and shortness of breath.    Cardiovascular:  Negative for chest pain and leg swelling.   Gastrointestinal:  Positive for constipation. Negative for abdominal pain, diarrhea, nausea and vomiting.   Endocrine: Negative for polyuria.   Genitourinary:  Negative for difficulty urinating, dysuria and hematuria.   Musculoskeletal:  Negative for back pain and neck pain.   Skin:  Negative for rash.   Neurological:  Negative for dizziness, light-headedness and numbness.   Psychiatric/Behavioral:  The patient is not nervous/anxious.        OBJECTIVE:  /78 (BP Location: Left arm, Patient Position: Sitting, Cuff Size: Adult)   Pulse 84   Ht 5' 2\" (1.575 m)   Wt 71.2 kg (157 lb)   LMP  (LMP Unknown)   SpO2 98%   BMI 28.72 kg/m²  Body mass index is 28.72 kg/m².    Physical exam:  Physical Exam  Vitals and nursing note reviewed.   Constitutional:       General: She is not in acute distress.     Appearance: Normal appearance. She is well-developed. She is not diaphoretic.   HENT:      Head: Normocephalic and atraumatic.      Nose: Nose normal.      Mouth/Throat:      Mouth: Mucous membranes are dry.      Pharynx: No oropharyngeal exudate.   Eyes:      General: No " scleral icterus.        Right eye: No discharge.         Left eye: No discharge.   Neck:      Thyroid: No thyromegaly.   Cardiovascular:      Rate and Rhythm: Normal rate and regular rhythm.      Heart sounds: No murmur heard.  Pulmonary:      Effort: Pulmonary effort is normal. No respiratory distress.      Breath sounds: Normal breath sounds. No wheezing.   Abdominal:      General: Bowel sounds are normal. There is no distension.      Palpations: Abdomen is soft.   Musculoskeletal:         General: No swelling. Normal range of motion.      Cervical back: Normal range of motion and neck supple.   Skin:     General: Skin is warm and dry.      Coloration: Skin is not jaundiced.      Findings: No rash.   Neurological:      General: No focal deficit present.      Mental Status: She is alert.      Motor: No abnormal muscle tone.      Comments: awake   Psychiatric:         Mood and Affect: Mood normal.         Behavior: Behavior normal.         Medications:    Current Outpatient Medications:     Cholecalciferol (VITAMIN D) 2000 units CAPS, Take by mouth 1 daily, Disp: , Rfl:     Eliquis 2.5 MG, TAKE ONE TABLET BY MOUTH TWICE A DAY, Disp: 180 tablet, Rfl: 2    enalapril (VASOTEC) 10 mg tablet, TAKE 1 TABLET BY MOUTH TWO TIMES A DAY, Disp: 180 tablet, Rfl: 1    flecainide (TAMBOCOR) 50 mg tablet, TAKE ONE TABLET BY MOUTH TWICE A DAY, Disp: 180 tablet, Rfl: 3    latanoprost (XALATAN) 0.005 % ophthalmic solution, 1 drop both eyes at bedtime, Disp: , Rfl:     metoprolol succinate (TOPROL-XL) 25 mg 24 hr tablet, TAKE ONE TABLET BY MOUTH EVERY DAY, Disp: 90 tablet, Rfl: 1    Multiple Vitamins-Minerals (PRESERVISION AREDS 2) CAPS, Take by mouth 2 (two) times a day, Disp: , Rfl:     simvastatin (ZOCOR) 10 mg tablet, TAKE ONE TABLET BY MOUTH DAILY AT BEDTIME, Disp: 30 tablet, Rfl: 0    Allergies:  Allergies as of 05/13/2025    (No Known Allergies)       The following portions of the patient's history were reviewed and updated as  "appropriate: past family history, past surgical history and problem list.    Laboratory Results:  Lab Results   Component Value Date    SODIUM 141 04/28/2025    K 4.9 04/28/2025     (H) 04/28/2025    CO2 26 04/28/2025    BUN 29 (H) 04/28/2025    CREATININE 1.59 (H) 04/28/2025    GLUC 87 04/01/2025    CALCIUM 9.4 04/28/2025        Lab Results   Component Value Date    CALCIUM 9.4 04/28/2025       Portions of the record may have been created with voice recognition software.  Occasional wrong word or \"sound a like\" substitutions may have occurred due to the inherent limitations of voice recognition software.  Read the chart carefully and recognize, using context, where substitutions have occurred.  "

## 2025-05-13 NOTE — ASSESSMENT & PLAN NOTE
-Chronic kidney disease stage IV in the setting of age-related nephron loss plus or minus hypertensive nephrosclerosis  - Baseline serum creatinine 1.3-1.4 up until early 2015, baseline has risen into the mid to high ones with most recent baseline 1.6-1.8, last serum creatinine 1.59 as of 4/28/25.  This correlates with an EGFR of 25-35 L/min. Renal function remains stable.  -Renal u/s March 2023 showed b/l cortical thinning and b/l renal cysts  -last UA with trace protein, microalb:Cr 50mg/g, UpCr 0.2g(improving)  -would avoid excessive vitamin C intake as this can lead to oxalate deposition in the setting of CKD. Avoid > 250mg vitamin C/day.  -avoid nonsteroidals including ibuprofen, aleve, advil, motrin, naproxen, celebrex, indomethacin, toradol  -avoid herbal teas containing dandelion root or licorice root or rosehips  -CKD education booklet provided in office previously  -would not want dialysis if needed due to family experiences  -low risk of kidney failure at 2 years and moderate risk of kidney failure at 5 years

## 2025-05-13 NOTE — ASSESSMENT & PLAN NOTE
- Hgb 9.7 as of 4/1/25, low   -prior iron panel showed low ferritin and low iron sat in Aug. 2020, iron 55, TIBC 315.   -Monitor CBC.  -myeloma work up negaitve

## 2025-05-13 NOTE — PATIENT INSTRUCTIONS
Chronic renal disease, stage IV (HCC)  -Chronic kidney disease stage IV in the setting of age-related nephron loss plus or minus hypertensive nephrosclerosis  - Baseline serum creatinine 1.3-1.4 up until early 2015, baseline has risen into the mid to high ones with most recent baseline 1.6-1.8, last serum creatinine 1.59 as of 4/28/25.  This correlates with an EGFR of 25-35 L/min. Renal function remains stable.  -Renal u/s March 2023 showed b/l cortical thinning and b/l renal cysts  -last UA with trace protein, microalb:Cr 50mg/g, UpCr 0.2g(improving)  -would avoid excessive vitamin C intake as this can lead to oxalate deposition in the setting of CKD. Avoid > 250mg vitamin C/day.  -avoid nonsteroidals including ibuprofen, aleve, advil, motrin, naproxen, celebrex, indomethacin, toradol  -avoid herbal teas containing dandelion root or licorice root or rosehips  -CKD education booklet provided in office previously  -would not want dialysis if needed due to family experiences  -low risk of kidney failure at 2 years and moderate risk of kidney failure at 5 years  Primary hypertension  -blood pressure acceptable in light of patient's age and history of CKD  -avoid high sodium/salt diet  -avoid caffeine  -continue enalapril 10mg twice daily, flecainide 50mg twice daily, metoprolol 25mg daily  -goal BP < 150/90, at goal  Hyperkalemia  -K 4.9 as of 4/28/25, monitor BMP  Vitamin D deficiency  -vitamin D 29.6, on vitamin D 2000u daily  -monitor vitamin D level annually  Nephrolithiasis   - noted on renal u/s performed March 2023  -recommend increased hydration to urinate 2-2.5L/day  -avoid high salt/sodium diet   Anemia due to stage 4 chronic kidney disease  (HCC)  - Hgb 9.7 as of 4/1/25, low   -prior iron panel showed low ferritin and low iron sat in Aug. 2020, iron 55, TIBC 315.   -Monitor CBC.  -myeloma work up negaitve    RTC in 6 months.  Obtain blood and urine testing prior to office visit in 3 months and 6  months.  Increase hydration.

## 2025-05-19 ENCOUNTER — EVALUATION (OUTPATIENT)
Facility: CLINIC | Age: 85
End: 2025-05-19
Attending: INTERNAL MEDICINE
Payer: COMMERCIAL

## 2025-05-19 DIAGNOSIS — R29.6 FALLS: Primary | ICD-10-CM

## 2025-05-19 PROCEDURE — 97110 THERAPEUTIC EXERCISES: CPT

## 2025-05-19 PROCEDURE — 97112 NEUROMUSCULAR REEDUCATION: CPT

## 2025-05-19 NOTE — PROGRESS NOTES
PT Re-Evaluation     Today's date: 2025  Patient name: Emily Gama  : 1940  MRN: [id number]  Referring provider: Shasha Villatoro DO  Dx:   Encounter Diagnosis     ICD-10-CM    1. Falls  R29.6           Start Time: 1530  Stop Time: 1615  Total time in clinic (min): 45 minutes    Assessment  Impairments: impaired balance    Assessment details: : Patient is a 84 y.o. year old adult presenting to OPPT s/p diagnosis of fall. Patient has made great gains towards her goals. Her gait speed of .94 m/s now classifies her to be a community ambulator. 6MWT remains on par with her IE and is also on par with her age matched norms demos good activity tolerance. FGA shows they are no longer classified to be a higher risk for falls scoring 24/30. Pt reports she does not feel limited functionally and feels more secure with her overall ambulation. Education provided regarding updated HEP and to continue with exercises outside of therapy. Also if there are any changes/regression to reach back out to clinic. Due to pt's progress it is recommended to discharge from skilled PT at this time.     IE: Patient is a 84 y.o. year old adult presenting to OPPT s/p diagnosis of fall.  Patient demonstrates decreased strength and balance  Pt ambulates at gait speed of .77 m/s with no AD, classifying them as a limited community ambulator. Upon MMT they demo BLE weakness. 6MWT reveals they are on par with their age matched norms demos good activity tolerance. FGA/MiniBest shows they are at risk for falls scoring, 18/30. Given written HEP with focus on balance. She will benefit from skilled PT interventions to maximize return to PLOF and independence as able. With greater focus  uneven surfaces and floor transfer without support. Thank you for this referral and the ability to participate in the care of this patient.    Understanding of Dx/Px/POC: good     Prognosis: good    Goals  In 4 weeks:  Pt will be able to complete 5STS from  standard arm chair with no BUE support </= 9s to demonstrate improved LE strength to assist with safety during transfers. Not met 5/19   Pt will be able to become independent with HEP with printed handout for LE strength  and balance to promote improved activity levels outside of therapy met 5/19   Pt will be able to improve FGA score >/= 21/30 to demonstrate improved balance and reduce fall risk met 5/19     In 8 weeks:  Pt will be able to improve FGA score >/= 25/30 to demonstrate improved balance and to no longer be classified as a higher risk for falls not met 5/19   Demonstrate consistent carryover with HEP and walking program. Met 5/19   Improve gait speed by at least  0.12 m/s to meet MCID value for older adults and reduce fall risk. Met 5/19    Improve 6MWT by at least 190ft with least restrictive device to demonstrate improvement in ability in community ambulation. Not met 5/19   Improve ABC to at least above 80% to show reduced risk of falling and improved balance confidence. Not met 5/19   Pt will be able to complete floor transfer without UE support to improve overall IND not met 5/19     All data taken from [facility] Neuro Section or Rehab Measures, UDPT Normative Values sheet     LANDRY test: 46/56                                                    5 x STS Test:  MDC: 6 points                                                                       MDC: 2.3 seconds   age norms                                                                           Age Norms   60-69 year old = M: 55, F: 55                                             60-69 year old: 11.4 seconds   70-79 year old = M 54,  F: 53                                             70-79 year old: 12.6 seconds    80-89 year old = M53,   F: 50                                             80-89 year old: 14.8 seconds      TUG test:                                                                     10 Meter Walk Test:  MDC: 4.14 seconds                                                                 MDC: .59 ft/sec  Cut off score for Falls                                                  Age Norms  > 13.5 seconds community dwelling adults                20-29; M: 4.56 ft/sec F: 4.62 ft/sec  > 32.2 Frail Elderly                                                     30-39: M 4.76 ft/sec  F: 4.68 ft/sec                                                                                                     40-49: M: 4.79 ft/sec  F: 4.62 ft/sec  6 Minute Walk Test                                                              50-59: M: 4.76 ft/sec  F: 4.56 ft/sec  MDC: 190 feet                                                                        60-69: M: 4.56 ft/sec  F: 4.26 ft/sec  Age Norms                                                                              70-+    M: 4.36 ft/sec  F: 4.16 ft/sec  60-69:    M: 1876 F: 1765  70-79:    M: 1729 F: 1545  80-89 +: M: 1368 F; 1286                                                                                                                                  FGA  Age matched Norms  -40-49 years= 28.9/ 50-59 years=28.4  -60-69 years=27.1/70-79 years=24.9  -80/89 years=20.8  MCID: Vestibular disorders: 8 points  MDC: Parkinsons: 0.61, Stroke: 4.2 points  Cut-offs:  -Community dwelling older adults              -22/30 : predict falls (Sensitivity 85%, Specificity 86%)              -20/30 (unexplained falls in the next 6 months) (Sensitivity 100%, Specificity 76%)  -Parkinson's              -5/30 (identify fallers in Parkinson's)        Plan  Patient would benefit from: skilled physical therapy    Planned therapy interventions: neuromuscular re-education, manual therapy, patient education, home exercise program, therapeutic exercise, therapeutic activities and gait training    Frequency: 2x week  Duration in weeks: 8  Plan of Care beginning date: 4/16/2025  Plan of Care expiration date: 6/11/2025  Treatment plan discussed with:  patient  Plan details: 5/19: d/c from skilled PT at this time        Subjective Evaluation    History of Present Illness  Mechanism of injury: 5/19: Pt reports no falls since beginning PT. She reports that she feels more comfortable with walking around and navigating her environment. She reports that could be more diligent about her HEP, however she tries to complete it whenever she remembers.     IE: Pt presents to OPPT after fall that occurred about 3 months ago. She fell while walking into [employer] while stepping over a curb where she did not  her foot high enough. Pt reports that she feels comfortable with her balance as long as is she holding onto something. IND with ADLs and IADLs and denies using AD. She lives alone in a single level home. Pt reports that she has trouble getting up from the floor without holding on to anything. Also noted difficulty over uneven surfaces such as grass and uneven pavement.   Quality of life: good    Patient Goals  Patient goals for therapy: improved balance  Patient goal: feel more secure with my walking met 5/19  Pain  No pain reported    Social Support  Steps to enter house: yes  3  Stairs in house: no   Lives in: one-story house  Lives with: alone    Employment status: not working  Hand dominance: right    Treatments  Previous treatment: physical therapy        Objective          Daily Treatment Diary     Precautions: H/o pacemaker, h/o a-fib, h/o chronic kidney dz, h/o HTN, h/o macular degeneration  CO-MORBIDITIES:  HEP ACCESS CODE: [email or web address]   FOTO Completed On:     POC Expires Reeval for Medicare to be completed  Unit Limit Auth Expiration Date PT/OT/STVisit Limit   6/11/25 By visit NA BOMN 12/31/25 BOMN    Completed on visit                    Auth Status DATE 4/16 4/24 4/28 5/6 5/8 5/19   NA Visit # 1 2 3 4 5 6    Remaining         FOM 5STS: 11.55s no BUE support    FGA: 18/30    6MWT: 1584 ft    10MWT:  .78m/s    ABC: 62.5%     5STS: 12.39s no BUE  "support    FGA: 24/30    6MWT: 1584 ft    10MWT:  .94m/s    ABC: 75.63%   MANUAL THERAPY                                                               THERAPEUTIC EXERCISE HEP: EC Semitandem 30sx3, HO+HT semitandem 30sx3                                                                                                                                                                NEUROMUSCULAR REEDUCATION           Static balance          Dynamic balance   Airex FT   HT/HN 60\" ea     FT/EC  60\"  2 min x3 color catch with pods on 6\" step standing on foam  2 min x2 color catch lateral steps over series of foam pads fwd/bwd toe taps    Semitanem on foam picking up cones from floor x2 rounds     Lateral steps tapping cones ~2ft apart 20ftx3     Foam beam    SOLO lateral x3 laps Lateral x2 occ Kristine     Hurdles   SOLO  Fwd/lat   6 lengths  SOLO fwd/lateral x3 laps ea over middle hurdles and foam pads      Uneven surfaces     6\"step + Airex    Alt toe taps 20x     Blaze pods alt taps  Random   2 min   Hits 45       Dynamic GT    SOLO  HT/HN  20ft x4 ea     Half turns  20ft x 4  20ft x2 HT/HN +tidal tank 20ft x3 HT+ bwd+tidal tank 20ft x4 HT+ bwd+tidal tank    Tandem walk    Semitandem walk 2x20ft   Semitandem walk 3x20ft     20ftx1 semitandem w/ ball toss    TM     SOLO no UE support .5mph progressed to .8mph stepping over bean bags 3 min x2      Floor transfer       Onto foam pad x2 required BUE support from chair to rise                                                     THERAPEUTIC ACTIVITY                                                  GAIT TRAINING                                                  MODALITIES                                             "

## 2025-05-21 ENCOUNTER — APPOINTMENT (OUTPATIENT)
Facility: CLINIC | Age: 85
End: 2025-05-21
Attending: INTERNAL MEDICINE
Payer: COMMERCIAL

## 2025-05-21 DIAGNOSIS — E78.5 HYPERLIPIDEMIA, UNSPECIFIED HYPERLIPIDEMIA TYPE: ICD-10-CM

## 2025-05-21 RX ORDER — SIMVASTATIN 10 MG
10 TABLET ORAL
Qty: 30 TABLET | Refills: 2 | Status: SHIPPED | OUTPATIENT
Start: 2025-05-21

## 2025-05-27 ENCOUNTER — APPOINTMENT (OUTPATIENT)
Facility: CLINIC | Age: 85
End: 2025-05-27
Attending: INTERNAL MEDICINE
Payer: COMMERCIAL

## 2025-05-27 ENCOUNTER — CONSULT (OUTPATIENT)
Dept: ENDOCRINOLOGY | Facility: HOSPITAL | Age: 85
End: 2025-05-27
Attending: INTERNAL MEDICINE
Payer: COMMERCIAL

## 2025-05-27 VITALS
DIASTOLIC BLOOD PRESSURE: 84 MMHG | WEIGHT: 155 LBS | BODY MASS INDEX: 28.52 KG/M2 | OXYGEN SATURATION: 100 % | HEIGHT: 62 IN | HEART RATE: 84 BPM | SYSTOLIC BLOOD PRESSURE: 138 MMHG

## 2025-05-27 DIAGNOSIS — E04.1 NODULE OF LEFT LOBE OF THYROID GLAND: Primary | ICD-10-CM

## 2025-05-27 PROCEDURE — 99204 OFFICE O/P NEW MOD 45 MIN: CPT | Performed by: INTERNAL MEDICINE

## 2025-05-27 NOTE — ASSESSMENT & PLAN NOTE
Orders:    T4, free; Future    TSH, 3rd generation; Future    Thyroid Peroxidase and Thyroglobulin Antibodies

## 2025-05-27 NOTE — PATIENT INSTRUCTIONS
The thyroid nodule is small and nonworrisome. It has not changed in the year since the last ultrasound. No need for repeat ultrasound.     I do not think the nodule had anything to do with the back of the head pain.     I would like to get thyroid blood work and antibodies.     If all is good, no endocrine follow up needed.     Follow up to be determined.

## 2025-05-27 NOTE — PROGRESS NOTES
Name: Emily Gama      : 1940      MRN: [id number]  Encounter Provider: Isa Mcadams MD  Encounter Date: 2025   Encounter department: [facility] DIABETES AND ENDOCRINOLOGY [place]    No chief complaint on file.  :  Assessment & Plan  Nodule of left lobe of thyroid gland    Orders:    T4, free; Future    TSH, 3rd generation; Future    Thyroid Peroxidase and Thyroglobulin Antibodies      Assessment & Plan  1.  Left sided thyroid nodule.  - The thyroid nodule is small and nonworrisome, with no changes observed over the past year since the last ultrasound.  - Physical exam reveals the thyroid is normal in size but irregular in feel, with no discrete nodules palpable.  - Blood work with antibodies will be ordered to check for any underlying autoimmune thyroid condition.  - If the blood work results are normal, no further endocrine follow-up will be necessary.    2. Posterior head pain: Resolved.  - The posterior head pain has resolved and is unlikely to be related to the thyroid nodule.  - Physical exam shows no lymphadenopathy in the neck and no CVA tenderness or spinous process tenderness.  - Possible causes discussed include muscle strain, improper sleeping position, or an enlarged lymph node due to a viral infection.  - No specific treatment is required as the pain has resolved.    Follow-up: To be determined based on blood work results.    I have asked her to get a TSH with free T4, thyroid peroxidase antibodies and antithyroglobulin antibodies at her earliest convenience.    I have spent a total time of 55 minutes in caring for this patient on the day of the visit/encounter including Diagnostic results, Prognosis, Risks and benefits of tx options, Instructions for management, Patient and family education, Importance of tx compliance, Risk factor reductions, Impressions, Counseling / Coordination of care, Documenting in the medical record, Reviewing/placing orders in the medical  record (including tests, medications, and/or procedures), and Obtaining or reviewing history  .      Pertinent Medical History   Emily Gama is an 84-year-old female with history of left-sided thyroid nodule.    Reportedly, a left-sided thyroid nodule was noted on CT scan a year ago and ultrasound was performed but no biopsy was needed.  She had left posterior head pain noted about a month ago and ended up in the emergency room for evaluation.  The CT scan in the emergency room again showed a left-sided thyroid nodule.  The pain disappeared on its own but a repeat ultrasound did show a thyroid nodule that is still present.         History of Present Illness   History of Present Illness  Emily Gama is an 84-year-old female here for evaluation/consultation regarding her thyroid.    She experienced intermittent pain in the left posterior aspect of her head approximately a month ago, which she initially dismissed as a transient discomfort. However, the pain escalated to a point where she sought emergency care. Despite extensive diagnostic workup including blood tests, CT scan, and x-ray, the cause of her pain remained elusive. An MRI was scheduled but subsequently deemed unnecessary, leading to her discharge. She was referred to an endocrinologist by Dr. Villatoro, who she will be seeing next week. The results of her ultrasound were reported as stable. She speculates that a severe cold she had during the winter, characterized by excessive mucus production, may have contributed to her head pain.    She reports no radiation exposure to her head or neck, dysphagia, or food impaction. She tends to feel cold frequently. She has a history of palpitations, for which she received a pacemaker approximately 2.5 years ago, but reports no significant palpitations since then. She occasionally experiences a fluttering sensation. She reports no tremors or shaky hands. She had constipation in the past, but it has resolved now. She  "identifies as a worrier, which disrupts her sleep, but does not endorse any depressive symptoms. She reports feeling fatigued, which she attributes to her age. She reports no dry skin or brittle nails. She experienced hair loss following her pacemaker implantation due to an allergic reaction to anesthesia, but her hair has since regrown. She has not been prescribed amiodarone in the past. She has a history of gastroparesis.    FAMILY HISTORY  Her mother had a stroke and hypertension and  at age 82. Her father had stomach cancer, kidney issues on dialysis, and hypertension and  at age 72.      Review of Systems as per [facility] [place]  Medical History Reviewed by provider this encounter:  Tobacco  Allergies  Meds  Problems  Med Hx  Surg Hx  Fam Hx     .  Medications Ordered Prior to Encounter[1]   Social History[2]     Medical History Reviewed by provider this encounter:  Tobacco  Allergies  Meds  Problems  Med Hx  Surg Hx  Fam Hx     .    Objective   /84   Pulse 84   Ht 5' 2\" (1.575 m)   Wt 70.3 kg (155 lb)   LMP  (LMP Unknown)   SpO2 100%   BMI 28.35 kg/m²      Body mass index is 28.35 kg/m².  Wt Readings from Last 3 Encounters:   25 70.3 kg (155 lb)   25 71.2 kg (157 lb)   25 70.3 kg (155 lb)     Physical Exam  Physical Exam  Head and Neck: No lid lag, strabismus, or periorbital edema observed. Thyroid is normal in size but irregular in feel. No discrete nodules are palpable. No lymphadenopathy in the neck.  Cardiovascular: Heart has a regular rate and rhythm. No murmurs detected.  Respiratory: Lungs are clear to auscultation.  Abdomen: No CVA tenderness or spinous process tenderness detected.  Musculoskeletal: No tremor of the outstretched hands observed. Patellar deep tendon reflexes are normal.  Extremities: No lower extremity edema present.    Results    Labs: I have reviewed pertinent labs including:   Lab Results   Component Value Date    HMN0NDMCERXR 2.617 2025 "      Lab Results   Component Value Date    FREET4 1.28 (H) 04/03/2024      Radiology Results Review: I have reviewed the following images/report studies in PACS: US thyroid  Result Date: 4/23/2025    COMPARISON: Neck CT from 3/31/2025. Thyroid ultrasound from 4/25/2024.     TECHNIQUE: Ultrasound of the thyroid was performed with a high frequency linear transducer in transverse and sagittal planes including volumetric imaging sweeps as well as traditional still imaging technique.     FINDINGS:  Thyroid texture: Thyroid parenchyma is diffusely heterogeneous in echotexture and also hyperemic.     Right lobe: 5.1 x 1.4 x 1.5 cm. Volume 5.0 mL  Left lobe: 3.7 x 1.3 x 1.4 cm. Volume 3.0 mL  Isthmus: 0.1 cm.     Nodule #1. Image 41.  LEFT midgland nodule measuring 1.0 x 0.7 x 0.7 cm. Unchanged from prior.  COMPOSITION: 2 points, solid or almost completely solid.  ECHOGENICITY: 1 point, hyperechoic or isoechoic.  SHAPE: 0 points, wider-than-tall.  MARGIN: 0 points, smooth.  ECHOGENIC FOCI: 0 points, none or large comet-tail artifacts.  TI-RADS Classification: TR 3 (3 points). FNA if >/= 2.5 cm. Follow if >/= 1.5 cm.     No new thyroid nodules.    Impression     Stable heterogeneous thyroid gland with small left thyroid lobe nodule. No nodule meets current ACR criteria for requiring biopsy or follow-up ultrasounds.     Reference: ACR Thyroid Imaging, Reporting and Data System (TI-RADS): White Paper of the ACR TI-RADS Committee. J AM Ivan Radiol 2017;14:587-595. Additional recommendations based on American Thyroid Association 2015 guidelines.       Workstation performed: [id number]     US thyroid  Result Date: 5/1/2024  Impression     1. Mildly heterogeneous thyroid echotexture with increased vascularity in keeping with provided history of thyroiditis 2. Solitary thyroid nodule which does not require fine-needle aspiration or follow-up 3. With respect to prior neck CT, no perithyroidal fluid collections or adenopathy. As per  notes in Epic, patient reports feeling much better, and follow-up neck CT was being considered. Clinical impression should guide whether  follow-up neck CT is required for patient care. (No ultrasonographic findings are seen that would require specific follow-up)       Reference: ACR Thyroid Imaging, Reporting and Data System (TI-RADS): White Paper of the ACR TI-RADS Committee. J AM Ivan Radiol 2017;14:587-595. Additional recommendations based on American Thyroid Association 2015 guidelines.         Workstation performed: [id number]          Patient Instructions   The thyroid nodule is small and nonworrisome. It has not changed in the year since the last ultrasound. No need for repeat ultrasound.     I do not think the nodule had anything to do with the back of the head pain.     I would like to get thyroid blood work and antibodies.     If all is good, no endocrine follow up needed.     Follow up to be determined.         Discussed with the patient and all questioned fully answered. She will call me if any problems arise.           [1]   Current Outpatient Medications on File Prior to Visit   Medication Sig Dispense Refill    Cholecalciferol (VITAMIN D) 2000 units CAPS Take by mouth 1 daily      Eliquis 2.5 MG TAKE ONE TABLET BY MOUTH TWICE A  tablet 2    enalapril (VASOTEC) 10 mg tablet TAKE 1 TABLET BY MOUTH TWO TIMES A  tablet 1    flecainide (TAMBOCOR) 50 mg tablet TAKE ONE TABLET BY MOUTH TWICE A  tablet 3    latanoprost (XALATAN) 0.005 % ophthalmic solution 1 drop both eyes at bedtime      metoprolol succinate (TOPROL-XL) 25 mg 24 hr tablet TAKE ONE TABLET BY MOUTH EVERY DAY 90 tablet 1    Multiple Vitamins-Minerals (PRESERVISION AREDS 2) CAPS Take by mouth in the morning and in the evening.      simvastatin (ZOCOR) 10 mg tablet TAKE ONE TABLET BY MOUTH DAILY AT BEDTIME 30 tablet 2     No current facility-administered medications on file prior to visit.   [2]   Social History  Tobacco Use     Smoking status: Never    Smokeless tobacco: Never   Vaping Use    Vaping status: Never Used   Substance and Sexual Activity    Alcohol use: Yes     Comment: Social    Drug use: No    Sexual activity: Never

## 2025-05-29 ENCOUNTER — APPOINTMENT (OUTPATIENT)
Dept: LAB | Facility: HOSPITAL | Age: 85
End: 2025-05-29
Payer: COMMERCIAL

## 2025-05-29 ENCOUNTER — APPOINTMENT (OUTPATIENT)
Facility: CLINIC | Age: 85
End: 2025-05-29
Attending: INTERNAL MEDICINE
Payer: COMMERCIAL

## 2025-05-29 DIAGNOSIS — E04.1 NODULE OF LEFT LOBE OF THYROID GLAND: ICD-10-CM

## 2025-05-29 DIAGNOSIS — E55.9 VITAMIN D DEFICIENCY: ICD-10-CM

## 2025-05-29 DIAGNOSIS — I10 PRIMARY HYPERTENSION: ICD-10-CM

## 2025-05-29 DIAGNOSIS — N18.4 CHRONIC RENAL DISEASE, STAGE IV (HCC): ICD-10-CM

## 2025-05-29 LAB
25(OH)D3 SERPL-MCNC: 32.1 NG/ML (ref 30–100)
ALBUMIN SERPL BCG-MCNC: 3.9 G/DL (ref 3.5–5)
ALP SERPL-CCNC: 112 U/L (ref 34–104)
ALT SERPL W P-5'-P-CCNC: 16 U/L (ref 7–52)
ANION GAP SERPL CALCULATED.3IONS-SCNC: 7 MMOL/L (ref 4–13)
AST SERPL W P-5'-P-CCNC: 19 U/L (ref 13–39)
BASOPHILS # BLD AUTO: 0.02 THOUSANDS/ÂΜL (ref 0–0.1)
BASOPHILS NFR BLD AUTO: 0 % (ref 0–1)
BILIRUB SERPL-MCNC: 0.42 MG/DL (ref 0.2–1)
BUN SERPL-MCNC: 30 MG/DL (ref 5–25)
CALCIUM SERPL-MCNC: 9.6 MG/DL (ref 8.4–10.2)
CHLORIDE SERPL-SCNC: 110 MMOL/L (ref 96–108)
CHOLEST SERPL-MCNC: 124 MG/DL (ref ?–200)
CO2 SERPL-SCNC: 24 MMOL/L (ref 21–32)
CREAT SERPL-MCNC: 1.74 MG/DL (ref 0.6–1.3)
EOSINOPHIL # BLD AUTO: 0.26 THOUSAND/ÂΜL (ref 0–0.61)
EOSINOPHIL NFR BLD AUTO: 5 % (ref 0–6)
ERYTHROCYTE [DISTWIDTH] IN BLOOD BY AUTOMATED COUNT: 14.1 % (ref 11.6–15.1)
GFR SERPL CREATININE-BSD FRML MDRD: 26 ML/MIN/1.73SQ M
GLUCOSE P FAST SERPL-MCNC: 85 MG/DL (ref 65–99)
HCT VFR BLD AUTO: 32 % (ref 34.8–46.1)
HDLC SERPL-MCNC: 62 MG/DL
HGB BLD-MCNC: 10 G/DL (ref 11.5–15.4)
IMM GRANULOCYTES # BLD AUTO: 0.01 THOUSAND/UL (ref 0–0.2)
IMM GRANULOCYTES NFR BLD AUTO: 0 % (ref 0–2)
LDLC SERPL CALC-MCNC: 47 MG/DL (ref 0–100)
LYMPHOCYTES # BLD AUTO: 1.51 THOUSANDS/ÂΜL (ref 0.6–4.47)
LYMPHOCYTES NFR BLD AUTO: 28 % (ref 14–44)
MCH RBC QN AUTO: 30.3 PG (ref 26.8–34.3)
MCHC RBC AUTO-ENTMCNC: 31.3 G/DL (ref 31.4–37.4)
MCV RBC AUTO: 97 FL (ref 82–98)
MONOCYTES # BLD AUTO: 0.41 THOUSAND/ÂΜL (ref 0.17–1.22)
MONOCYTES NFR BLD AUTO: 8 % (ref 4–12)
NEUTROPHILS # BLD AUTO: 3.19 THOUSANDS/ÂΜL (ref 1.85–7.62)
NEUTS SEG NFR BLD AUTO: 59 % (ref 43–75)
NRBC BLD AUTO-RTO: 0 /100 WBCS
PLATELET # BLD AUTO: 180 THOUSANDS/UL (ref 149–390)
PMV BLD AUTO: 11.1 FL (ref 8.9–12.7)
POTASSIUM SERPL-SCNC: 4.9 MMOL/L (ref 3.5–5.3)
PROT SERPL-MCNC: 6.9 G/DL (ref 6.4–8.4)
RBC # BLD AUTO: 3.3 MILLION/UL (ref 3.81–5.12)
SODIUM SERPL-SCNC: 141 MMOL/L (ref 135–147)
T4 FREE SERPL-MCNC: 0.99 NG/DL (ref 0.61–1.12)
TRIGL SERPL-MCNC: 74 MG/DL (ref ?–150)
TSH SERPL DL<=0.05 MIU/L-ACNC: 2.39 UIU/ML (ref 0.45–4.5)
WBC # BLD AUTO: 5.4 THOUSAND/UL (ref 4.31–10.16)

## 2025-05-29 PROCEDURE — 84439 ASSAY OF FREE THYROXINE: CPT

## 2025-05-29 PROCEDURE — 86376 MICROSOMAL ANTIBODY EACH: CPT

## 2025-05-29 PROCEDURE — 80053 COMPREHEN METABOLIC PANEL: CPT

## 2025-05-29 PROCEDURE — 80061 LIPID PANEL: CPT

## 2025-05-29 PROCEDURE — 86800 THYROGLOBULIN ANTIBODY: CPT

## 2025-05-29 PROCEDURE — 85025 COMPLETE CBC W/AUTO DIFF WBC: CPT

## 2025-05-29 PROCEDURE — 84443 ASSAY THYROID STIM HORMONE: CPT

## 2025-05-29 PROCEDURE — 82306 VITAMIN D 25 HYDROXY: CPT

## 2025-05-29 PROCEDURE — 36415 COLL VENOUS BLD VENIPUNCTURE: CPT

## 2025-05-30 ENCOUNTER — RESULTS FOLLOW-UP (OUTPATIENT)
Dept: ENDOCRINOLOGY | Facility: HOSPITAL | Age: 85
End: 2025-05-30

## 2025-05-30 LAB
THYROGLOB AB SERPL-ACNC: <1 IU/ML (ref 0–0.9)
THYROPEROXIDASE AB SERPL-ACNC: 9 IU/ML (ref 0–34)

## 2025-05-31 ENCOUNTER — TELEPHONE (OUTPATIENT)
Dept: OTHER | Facility: OTHER | Age: 85
End: 2025-05-31

## 2025-05-31 NOTE — TELEPHONE ENCOUNTER
Patient is out of town for  and needs to reschedule appt . She will call back to reschedule as she does not have her appt book on hand.

## 2025-06-03 ENCOUNTER — TELEPHONE (OUTPATIENT)
Age: 85
End: 2025-06-03

## 2025-06-03 ENCOUNTER — REMOTE DEVICE CLINIC VISIT (OUTPATIENT)
Dept: CARDIOLOGY CLINIC | Facility: CLINIC | Age: 85
End: 2025-06-03
Payer: COMMERCIAL

## 2025-06-03 ENCOUNTER — RESULTS FOLLOW-UP (OUTPATIENT)
Dept: CARDIOLOGY CLINIC | Facility: CLINIC | Age: 85
End: 2025-06-03

## 2025-06-03 DIAGNOSIS — Z95.0 CARDIAC PACEMAKER IN SITU: Primary | ICD-10-CM

## 2025-06-03 PROCEDURE — 93296 REM INTERROG EVL PM/IDS: CPT | Performed by: INTERNAL MEDICINE

## 2025-06-03 PROCEDURE — 93294 REM INTERROG EVL PM/LDLS PM: CPT | Performed by: INTERNAL MEDICINE

## 2025-06-03 NOTE — TELEPHONE ENCOUNTER
Patient is moving to [place] on July 14 th but would like to have AWV done prior to that only with Dr. Villatoro. Please call to let her know if anything is available. I do not see any availability before July 19 th. Please call her with any cancellations. She would like to see Dr. Villatoro prior to her move to [place].    Thank you!!

## 2025-06-03 NOTE — PROGRESS NOTES
"Results for orders placed or performed in visit on 06/03/25   Cardiac EP device report    Narrative    MDT DC PM / ACTIVE SYSTEM IS MRI CONDITIONAL  CARELINK TRANSMISSION: PRESENTING EGRAM AP VS@ 86 BPM. BATTERY STATUS \"10 YRS.\" %  0%. ALL AVAILABLE LEAD PARAMETERS WITHIN NORMAL LIMITS. NO SIGNIFICANT HIGH RATE EPISODES. NORMAL DEVICE FUNCTION. NC         "

## 2025-06-05 DIAGNOSIS — I48.0 PAROXYSMAL ATRIAL FIBRILLATION (HCC): ICD-10-CM

## 2025-06-05 RX ORDER — METOPROLOL SUCCINATE 25 MG/1
25 TABLET, EXTENDED RELEASE ORAL DAILY
Qty: 90 TABLET | Refills: 1 | Status: SHIPPED | OUTPATIENT
Start: 2025-06-05

## 2025-06-11 ENCOUNTER — PROCEDURE VISIT (OUTPATIENT)
Dept: PODIATRY | Facility: CLINIC | Age: 85
End: 2025-06-11

## 2025-06-11 VITALS — HEIGHT: 62 IN | BODY MASS INDEX: 28.52 KG/M2 | WEIGHT: 155 LBS

## 2025-06-11 DIAGNOSIS — B35.1 ONYCHOMYCOSIS: Primary | ICD-10-CM

## 2025-06-11 PROCEDURE — NCFTCARE PR NON-COVERED FOOT CARE: Performed by: PODIATRIST

## 2025-06-11 NOTE — PROGRESS NOTES
PATIENT:  Emily Gama  1940    ASSESSMENT/PLAN:  1. Onychomycosis               The patient was educated in proper foot wear.  Also discussed daily foot assessment and proper foot care.  The patient will follow up in 10 weeks.      PROCEDURE:  All mycotic toenails were reduced and debrided in length, width, and girth using a nail nipper and dremel.  Patient tolerated procedure(s) well without complications.        HPI:  Emily Gama is a 84 y.o.year old female seen for non-covered foot care.   The patient denied any acute pedal disorder.          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    Chronic gastric ulcer without hemorrhage and without perforation 04/20/2020    Chronic kidney disease     Colon polyps     Coronary artery disease     Gastroparesis     Glaucoma     Hyperlipidemia     Hypertension     Ischemic colitis (HCC) 11/2013    Macular degeneration     Pacemaker        PAST SURGICAL HISTORY:  Past Surgical History:   Procedure Laterality Date    BREAST BIOPSY      biopsy    CARDIAC ELECTROPHYSIOLOGY PROCEDURE N/A 05/17/2022    Procedure: Cardiac eps/aflutter ablation;  Surgeon: Carl Perez MD;  Location: [facility];  Service: Cardiology    CARDIAC ELECTROPHYSIOLOGY PROCEDURE N/A 05/17/2022    Procedure: CARDIAC PACER IMPLANT;  Surgeon: Carl Perez MD;  Location: [facility];  Service: Cardiology    CARDIAC ELECTROPHYSIOLOGY PROCEDURE Left 05/18/2022    Procedure: Cardiac lead revision;  Surgeon: Carl Perez MD;  Location: [facility];  Service: Cardiology    COLONOSCOPY  06/13/2013    COLONOSCOPY      June 2013 sigmoid diverticulosis and internal hemorrhoids.  Previous colonoscopy is with adenomatous polyps.    EGD  02/11/2013    EYE SURGERY Bilateral     cataract surgery    TONSILLECTOMY      UPPER  GASTROINTESTINAL ENDOSCOPY      March 2021 hiatal hernia, gastric ulcer healed, minimal gastritis.  March 2020 irregular Z-line, hiatal hernia, gastric ulcer.  Biopsies negative for H pylori or malignancy.  Biopsies negative for Leonard's.  February 2013 esophagus normal, gastritis and duodenitis, biopsies negative for H pylori.    WISDOM TOOTH EXTRACTION          ALLERGIES:  Patient has no known allergies.    MEDICATIONS:  Current Outpatient Medications   Medication Sig Dispense Refill    Cholecalciferol (VITAMIN D) 2000 units CAPS Take by mouth 1 daily      Eliquis 2.5 MG TAKE ONE TABLET BY MOUTH TWICE A  tablet 2    enalapril (VASOTEC) 10 mg tablet TAKE 1 TABLET BY MOUTH TWO TIMES A  tablet 1    flecainide (TAMBOCOR) 50 mg tablet TAKE ONE TABLET BY MOUTH TWICE A  tablet 3    latanoprost (XALATAN) 0.005 % ophthalmic solution 1 drop both eyes at bedtime      metoprolol succinate (TOPROL-XL) 25 mg 24 hr tablet TAKE ONE TABLET BY MOUTH EVERY DAY 90 tablet 1    Multiple Vitamins-Minerals (PRESERVISION AREDS 2) CAPS Take by mouth in the morning and in the evening.      simvastatin (ZOCOR) 10 mg tablet TAKE ONE TABLET BY MOUTH DAILY AT BEDTIME 30 tablet 2     No current facility-administered medications for this visit.       SOCIAL HISTORY:  Social History     Socioeconomic History    Marital status:    Tobacco Use    Smoking status: Never    Smokeless tobacco: Never   Vaping Use    Vaping status: Never Used   Substance and Sexual Activity    Alcohol use: Yes     Comment: Social    Drug use: No    Sexual activity: Never   Social History Narrative    Active advance directive    Exercise habits---walks and does occasional stepper at home    Living alone    Living situation: supportive and safe    Sees dentist occasionally     Social Drivers of Health     Financial Resource Strain: Low Risk  (12/12/2023)    Overall Financial Resource Strain (CARDIA)     Difficulty of Paying Living Expenses: Not  "hard at all   Food Insecurity: No Food Insecurity (2025)    Nursing - Inadequate Food Risk Classification     Ran Out of Food in the Last Year: Never true   Transportation Needs: No Transportation Needs (2025)    Nursing - Transportation Risk Classification     Lack of Transportation: No   Physical Activity: Insufficiently Active (2/10/2021)    Exercise Vital Sign     Days of Exercise per Week: 3 days     Minutes of Exercise per Session: 30 min   Stress: Stress Concern Present (2/10/2021)    [employer] - Occupational Stress Questionnaire     Feeling of Stress : To some extent   Intimate Partner Violence: Unknown (2025)    Nursing IPS     Physically Hurt by Someone: No     Hurt or Threatened by Someone: No   Housing Stability: Unknown (2025)    Nursing: Inadequate Housing Risk Classification     Unable to Pay for Housing in the Last Year: No     Has Housin        REVIEW OF SYSTEMS:  GENERAL: No fever or chills  HEART: No chest pain, or palpitation  RESPIRATORY:  No acute SOB or cough  GI: No Nausea, vomit or diarrhea  NEUROLOGIC: No syncope or acute weakness    PHYSICAL EXAM:    Ht 5' 2\" (1.575 m) Comment: stated  Wt 70.3 kg (155 lb)   LMP  (LMP Unknown)   BMI 28.35 kg/m²     VASCULAR EXAM  Pedal pulses are intact.  CRT is WNL.     NEUROLOGIC EXAM  Sensation is intact to light touch.  No focal neurologic deficit.          DERMATOLOGIC EXAM:   No ulcer or cellulitis noted.  The patient has hypertrophic toenails X 6 with discoloration, onycholysis, and subungal debris.      MUSCULOSKELETAL EXAM:   No acute joint pain, edema, or redness.  No acute musculoskeletal problem.                  "

## 2025-07-10 ENCOUNTER — OFFICE VISIT (OUTPATIENT)
Dept: FAMILY MEDICINE CLINIC | Facility: HOSPITAL | Age: 85
End: 2025-07-10
Payer: COMMERCIAL

## 2025-07-10 VITALS
HEIGHT: 62 IN | DIASTOLIC BLOOD PRESSURE: 80 MMHG | OXYGEN SATURATION: 99 % | SYSTOLIC BLOOD PRESSURE: 130 MMHG | WEIGHT: 152 LBS | BODY MASS INDEX: 27.97 KG/M2 | HEART RATE: 68 BPM

## 2025-07-10 DIAGNOSIS — I48.3 TYPICAL ATRIAL FLUTTER (HCC): ICD-10-CM

## 2025-07-10 DIAGNOSIS — I10 PRIMARY HYPERTENSION: ICD-10-CM

## 2025-07-10 DIAGNOSIS — E78.5 HYPERLIPIDEMIA, UNSPECIFIED HYPERLIPIDEMIA TYPE: ICD-10-CM

## 2025-07-10 DIAGNOSIS — I48.0 PAROXYSMAL ATRIAL FIBRILLATION (HCC): ICD-10-CM

## 2025-07-10 DIAGNOSIS — Z00.00 MEDICARE ANNUAL WELLNESS VISIT, SUBSEQUENT: Primary | ICD-10-CM

## 2025-07-10 PROCEDURE — G0439 PPPS, SUBSEQ VISIT: HCPCS | Performed by: INTERNAL MEDICINE

## 2025-07-10 RX ORDER — SIMVASTATIN 10 MG
10 TABLET ORAL
Qty: 90 TABLET | Refills: 0 | Status: SHIPPED | OUTPATIENT
Start: 2025-07-10

## 2025-07-10 RX ORDER — METOPROLOL SUCCINATE 25 MG/1
25 TABLET, EXTENDED RELEASE ORAL DAILY
Qty: 90 TABLET | Refills: 0 | Status: SHIPPED | OUTPATIENT
Start: 2025-07-10

## 2025-07-10 RX ORDER — FLECAINIDE ACETATE 50 MG/1
50 TABLET ORAL 2 TIMES DAILY
Qty: 180 TABLET | Refills: 0 | Status: SHIPPED | OUTPATIENT
Start: 2025-07-10

## 2025-07-10 RX ORDER — ENALAPRIL MALEATE 10 MG/1
10 TABLET ORAL 2 TIMES DAILY
Qty: 180 TABLET | Refills: 0 | Status: SHIPPED | OUTPATIENT
Start: 2025-07-10

## 2025-07-10 NOTE — ASSESSMENT & PLAN NOTE
Orders:    enalapril (VASOTEC) 10 mg tablet; Take 1 tablet (10 mg total) by mouth 2 (two) times a day

## 2025-07-10 NOTE — ASSESSMENT & PLAN NOTE
Orders:    simvastatin (ZOCOR) 10 mg tablet; Take 1 tablet (10 mg total) by mouth daily at bedtime

## 2025-07-10 NOTE — PROGRESS NOTES
Name: Emily Gama      : 1940      MRN: [id number]  Encounter Provider: Shasha Villatoro DO  Encounter Date: 7/10/2025   Encounter department: [facility] [address]  :  Assessment & Plan  Medicare annual wellness visit, subsequent         Paroxysmal atrial fibrillation (HCC)    Orders:    metoprolol succinate (TOPROL-XL) 25 mg 24 hr tablet; Take 1 tablet (25 mg total) by mouth daily    flecainide (TAMBOCOR) 50 mg tablet; Take 1 tablet (50 mg total) by mouth 2 (two) times a day    Primary hypertension    Orders:    enalapril (VASOTEC) 10 mg tablet; Take 1 tablet (10 mg total) by mouth 2 (two) times a day    Hyperlipidemia, unspecified hyperlipidemia type    Orders:    simvastatin (ZOCOR) 10 mg tablet; Take 1 tablet (10 mg total) by mouth daily at bedtime    Typical atrial flutter (HCC)    Orders:    apixaban (Eliquis) 2.5 mg; Take 1 tablet (2.5 mg total) by mouth 2 (two) times a day       Preventive health issues were discussed with patient, and age appropriate screening tests were ordered as noted in patient's After Visit Summary. Personalized health advice and appropriate referrals for health education or preventive services given if needed, as noted in patient's After Visit Summary.    History of Present Illness     Did pt for leg issues- now busy moving  and packing- will be in [place] near family   Ongoing issues with ckd stage 4- sees Dr. Peters   Sees  q 6 months- I have given paper rx for the  move for 90 day supply- will need to find a cardiology team to monitor her pacemaker       Patient Care Team:  Shasha Villatoro DO as PCP - General (Internal Medicine)  Cherri Damon MD as PCP - PCP-[facility] ([facility])  DOMO Potter MD Michael J Cassidy, MD Hina K Trivedi, DO (Nephrology)  ELSIE Jama as Nurse Practitioner (Nurse Practitioner)    Review of Systems   Constitutional:  Negative for fatigue.   HENT:  Negative for congestion.     Respiratory:  Negative for chest tightness and shortness of breath.    Cardiovascular:  Negative for chest pain and palpitations.   Neurological:  Negative for dizziness and headaches.   All other systems reviewed and are negative.    Medical History Reviewed by provider this encounter:       Annual Wellness Visit Questionnaire   Emily is here for her Subsequent Wellness visit.     Health Risk Assessment:   Patient rates overall health as fair. Patient feels that their physical health rating is same. Patient is satisfied with their life. Eyesight was rated as same. Hearing was rated as same. Patient feels that their emotional and mental health rating is same. Patients states they are sometimes angry. Patient states they are always unusually tired/fatigued. Pain experienced in the last 7 days has been none. Patient states that she has experienced no weight loss or gain in last 6 months.     Depression Screening:   PHQ-2 Score: 0      Fall Risk Screening:   In the past year, patient has experienced: history of falling in past year    Number of falls: 1  Injured during fall?: No    Feels unsteady when standing or walking?: No    Worried about falling?: No      Urinary Incontinence Screening:   Patient has not leaked urine accidently in the last six months.     Home Safety:  Patient does not have trouble with stairs inside or outside of their home. Patient has working smoke alarms and has working carbon monoxide detector. Home safety hazards include: none.     Nutrition:   Current diet is Regular.     Medications:   Patient is currently taking over-the-counter supplements. OTC medications include: see medication list. Patient is able to manage medications.     Activities of Daily Living (ADLs)/Instrumental Activities of Daily Living (IADLs):   Walk and transfer into and out of bed and chair?: Yes  Dress and groom yourself?: Yes    Bathe or shower yourself?: Yes    Feed yourself? Yes  Do your laundry/housekeeping?:  Yes  Manage your money, pay your bills and track your expenses?: Yes  Make your own meals?: Yes    Do your own shopping?: Yes    Previous Hospitalizations:   Any hospitalizations or ED visits within the last 12 months?: Yes    How many hospitalizations have you had in the last year?: 1-2    Advance Care Planning:   Living will: Yes    Durable POA for healthcare: Yes    Advanced directive: Yes    Advanced directive counseling given: Yes    End of Life Decisions reviewed with patient: Yes    Provider agrees with end of life decisions: Yes      Cognitive Screening:   Provider or family/friend/caregiver concerned regarding cognition?: No    Preventive Screenings      Cardiovascular Screening:    General: Screening Not Indicated and History Lipid Disorder      Diabetes Screening:     General: Screening Current      Colorectal Cancer Screening:     General: Screening Not Indicated      Breast Cancer Screening:     General: Risks and Benefits Discussed and Patient Declines      Cervical Cancer Screening:    General: Screening Not Indicated and Risks and Benefits Discussed      Osteoporosis Screening:    General: Risks and Benefits Discussed      Abdominal Aortic Aneurysm (AAA) Screening:        General: Risks and Benefits Discussed      Lung Cancer Screening:     General: Screening Not Indicated    Immunizations:  - Immunizations due: Zoster (Shingrix)    Screening, Brief Intervention, and Referral to Treatment (SBIRT)     Screening      AUDIT-C Screenin) How often did you have a drink containing alcohol in the past year? 2 to 4 times a month  2) How many drinks did you have on a typical day when you were drinking in the past year? 1 to 2  3) How often did you have 6 or more drinks on one occasion in the past year? never    AUDIT-C Score: 2  Interpretation: Score 0-2 (female): Negative screen for alcohol misuse    Single Item Drug Screening:  How often have you used an illegal drug (including marijuana) or a  "prescription medication for non-medical reasons in the past year? never    Single Item Drug Screen Score: 0  Interpretation: Negative screen for possible drug use disorder    Social Drivers of Health     Financial Resource Strain: Low Risk  (12/12/2023)    Overall Financial Resource Strain (CARDIA)     Difficulty of Paying Living Expenses: Not hard at all   Food Insecurity: No Food Insecurity (7/10/2025)    Nursing - Inadequate Food Risk Classification     Worried About Running Out of Food in the Last Year: Never true     Ran Out of Food in the Last Year: Never true   Transportation Needs: No Transportation Needs (7/10/2025)    PRAPARE - Transportation     Lack of Transportation (Medical): No     Lack of Transportation (Non-Medical): No   Housing Stability: Low Risk  (7/10/2025)    Housing Stability Vital Sign     Unable to Pay for Housing in the Last Year: No     Number of Times Moved in the Last Year: 0     Homeless in the Last Year: No   Utilities: Not At Risk (7/10/2025)    [facility] Utilities     Threatened with loss of utilities: No     No results found.    Objective   /80   Pulse 68   Ht 5' 2\" (1.575 m)   Wt 68.9 kg (152 lb)   LMP  (LMP Unknown)   SpO2 99%   BMI 27.80 kg/m²     Physical Exam  Vitals and nursing note reviewed.   Constitutional:       General: She is not in acute distress.  HENT:      Head: Normocephalic.      Right Ear: Tympanic membrane normal.      Left Ear: Tympanic membrane normal.      Nose: No congestion.      Mouth/Throat:      Mouth: Mucous membranes are moist.      Pharynx: No posterior oropharyngeal erythema.     Eyes:      General:         Right eye: No discharge.         Left eye: No discharge.      Conjunctiva/sclera: Conjunctivae normal.       Cardiovascular:      Rate and Rhythm: Normal rate and regular rhythm.      Heart sounds: No murmur heard.  Pulmonary:      Breath sounds: No wheezing or rhonchi.   Abdominal:      Palpations: Abdomen is soft.      Tenderness: There is " no abdominal tenderness.     Musculoskeletal:      Cervical back: No tenderness.      Right lower leg: No edema.      Left lower leg: No edema.   Lymphadenopathy:      Cervical: No cervical adenopathy.     Skin:     Coloration: Skin is not pale.      Findings: No erythema.     Neurological:      Mental Status: She is alert and oriented to person, place, and time.     Psychiatric:         Mood and Affect: Mood normal.

## 2025-07-10 NOTE — PATIENT INSTRUCTIONS
Medicare Preventive Visit Patient Instructions  Thank you for completing your Welcome to Medicare Visit or Medicare Annual Wellness Visit today. Your next wellness visit will be due in one year (7/11/2026).  The screening/preventive services that you may require over the next 5-10 years are detailed below. Some tests may not apply to you based off risk factors and/or age. Screening tests ordered at today's visit but not completed yet may show as past due. Also, please note that scanned in results may not display below.  Preventive Screenings:  Service Recommendations Previous Testing/Comments   Colorectal Cancer Screening  * Colonoscopy    * Fecal Occult Blood Test (FOBT)/Fecal Immunochemical Test (FIT)  * Fecal DNA/Cologuard Test  * Flexible Sigmoidoscopy Age: 45-75 years old   Colonoscopy: every 10 years (may be performed more frequently if at higher risk)  OR  FOBT/FIT: every 1 year  OR  Cologuard: every 3 years  OR  Sigmoidoscopy: every 5 years  Screening may be recommended earlier than age 45 if at higher risk for colorectal cancer. Also, an individualized decision between you and your healthcare provider will decide whether screening between the ages of 76-85 would be appropriate. Colonoscopy: 06/13/2013  FOBT/FIT: Not on file  Cologuard: Not on file  Sigmoidoscopy: Not on file    Screening Not Indicated     Breast Cancer Screening Age: 40+ years old  Frequency: every 1-2 years  Not required if history of left and right mastectomy Mammogram: Not on file        Cervical Cancer Screening Between the ages of 21-29, pap smear recommended once every 3 years.   Between the ages of 30-65, can perform pap smear with HPV co-testing every 5 years.   Recommendations may differ for women with a history of total hysterectomy, cervical cancer, or abnormal pap smears in past. Pap Smear: Not on file    Screening Not Indicated   Hepatitis C Screening Once for adults born between 1945 and 1965  More frequently in patients at  high risk for Hepatitis C Hep C Antibody: Not on file        Diabetes Screening 1-2 times per year if you're at risk for diabetes or have pre-diabetes Fasting glucose: 85 mg/dL (5/29/2025)  A1C: No results in last 5 years (No results in last 5 years)  Screening Current   Cholesterol Screening Once every 5 years if you don't have a lipid disorder. May order more often based on risk factors. Lipid panel: 05/29/2025    Screening Not Indicated  History Lipid Disorder     Other Preventive Screenings Covered by Medicare:  Abdominal Aortic Aneurysm (AAA) Screening: covered once if your at risk. You're considered to be at risk if you have a family history of AAA.  Lung Cancer Screening: covers low dose CT scan once per year if you meet all of the following conditions: (1) Age 55-77; (2) No signs or symptoms of lung cancer; (3) Current smoker or have quit smoking within the last 15 years; (4) You have a tobacco smoking history of at least 20 pack years (packs per day multiplied by number of years you smoked); (5) You get a written order from a healthcare provider.  Glaucoma Screening: covered annually if you're considered high risk: (1) You have diabetes OR (2) Family history of glaucoma OR (3)  aged 50 and older OR (4)  American aged 65 and older  Osteoporosis Screening: covered every 2 years if you meet one of the following conditions: (1) You're estrogen deficient and at risk for osteoporosis based off medical history and other findings; (2) Have a vertebral abnormality; (3) On glucocorticoid therapy for more than 3 months; (4) Have primary hyperparathyroidism; (5) On osteoporosis medications and need to assess response to drug therapy.   Last bone density test (DXA Scan): 05/16/2023.  HIV Screening: covered annually if you're between the age of 15-65. Also covered annually if you are younger than 15 and older than 65 with risk factors for HIV infection. For pregnant patients, it is covered up to 3  times per pregnancy.    Immunizations:  Immunization Recommendations   Influenza Vaccine Annual influenza vaccination during flu season is recommended for all persons aged >= 6 months who do not have contraindications   Pneumococcal Vaccine   * Pneumococcal conjugate vaccine = PCV13 (Prevnar 13), PCV15 (Vaxneuvance), PCV20 (Prevnar 20)  * Pneumococcal polysaccharide vaccine = PPSV23 (Pneumovax) Adults 19-65 yo with certain risk factors or if 65+ yo  If never received any pneumonia vaccine: recommend Prevnar 20 (PCV20)  Give PCV20 if previously received 1 dose of PCV13 or PPSV23   Hepatitis B Vaccine 3 dose series if at intermediate or high risk (ex: diabetes, end stage renal disease, liver disease)   Respiratory syncytial virus (RSV) Vaccine - COVERED BY MEDICARE PART D  * RSVPreF3 (Arexvy) CDC recommends that adults 60 years of age and older may receive a single dose of RSV vaccine using shared clinical decision-making (SCDM)   Tetanus (Td) Vaccine - COST NOT COVERED BY MEDICARE PART B Following completion of primary series, a booster dose should be given every 10 years to maintain immunity against tetanus. Td may also be given as tetanus wound prophylaxis.   Tdap Vaccine - COST NOT COVERED BY MEDICARE PART B Recommended at least once for all adults. For pregnant patients, recommended with each pregnancy.   Shingles Vaccine (Shingrix) - COST NOT COVERED BY MEDICARE PART B  2 shot series recommended in those 19 years and older who have or will have weakened immune systems or those 50 years and older     Health Maintenance Due:  There are no preventive care reminders to display for this patient.  Immunizations Due:      Topic Date Due   • COVID-19 Vaccine (4 - 2024-25 season) 09/01/2024   • Influenza Vaccine (1) 09/01/2025     Advance Directives   What are advance directives?  Advance directives are legal documents that state your wishes and plans for medical care. These plans are made ahead of time in case you lose  your ability to make decisions for yourself. Advance directives can apply to any medical decision, such as the treatments you want, and if you want to donate organs.   What are the types of advance directives?  There are many types of advance directives, and each state has rules about how to use them. You may choose a combination of any of the following:  Living will:  This is a written record of the treatment you want. You can also choose which treatments you do not want, which to limit, and which to stop at a certain time. This includes surgery, medicine, IV fluid, and tube feedings.   Durable power of  for healthcare (DPAHC):  This is a written record that states who you want to make healthcare choices for you when you are unable to make them for yourself. This person, called a proxy, is usually a family member or a friend. You may choose more than 1 proxy.  Do not resuscitate (DNR) order:  A DNR order is used in case your heart stops beating or you stop breathing. It is a request not to have certain forms of treatment, such as CPR. A DNR order may be included in other types of advance directives.  Medical directive:  This covers the care that you want if you are in a coma, near death, or unable to make decisions for yourself. You can list the treatments you want for each condition. Treatment may include pain medicine, surgery, blood transfusions, dialysis, IV or tube feedings, and a ventilator (breathing machine).  Values history:  This document has questions about your views, beliefs, and how you feel and think about life. This information can help others choose the care that you would choose.  Why are advance directives important?  An advance directive helps you control your care. Although spoken wishes may be used, it is better to have your wishes written down. Spoken wishes can be misunderstood, or not followed. Treatments may be given even if you do not want them. An advance directive may make it  easier for your family to make difficult choices about your care.   Fall Prevention    Fall prevention  includes ways to make your home and other areas safer. It also includes ways you can move more carefully to prevent a fall. Health conditions that cause changes in your blood pressure, vision, or muscle strength and coordination may increase your risk for falls. Medicines may also increase your risk for falls if they make you dizzy, weak, or sleepy.   Fall prevention tips:   Stand or sit up slowly.    Use assistive devices as directed.    Wear shoes that fit well and have soles that .    Wear a personal alarm.    Stay active.    Manage your medical conditions.    Home Safety Tips:  Add items to prevent falls in the bathroom.    Keep paths clear.    Install bright lights in your home.    Keep items you use often on shelves within reach.    Paint or place reflective tape on the edges of your stairs.    Weight Management   Why it is important to manage your weight:  Being overweight increases your risk of health conditions such as heart disease, high blood pressure, type 2 diabetes, and certain types of cancer. It can also increase your risk for osteoarthritis, sleep apnea, and other respiratory problems. Aim for a slow, steady weight loss. Even a small amount of weight loss can lower your risk of health problems.  How to lose weight safely:  A safe and healthy way to lose weight is to eat fewer calories and get regular exercise. You can lose up about 1 pound a week by decreasing the number of calories you eat by 500 calories each day.   Healthy meal plan for weight management:  A healthy meal plan includes a variety of foods, contains fewer calories, and helps you stay healthy. A healthy meal plan includes the following:  Eat whole-grain foods more often.  A healthy meal plan should contain fiber. Fiber is the part of grains, fruits, and vegetables that is not broken down by your body. Whole-grain foods are  healthy and provide extra fiber in your diet. Some examples of whole-grain foods are whole-wheat breads and pastas, oatmeal, brown rice, and bulgur.  Eat a variety of vegetables every day.  Include dark, leafy greens such as spinach, kale, natalia greens, and mustard greens. Eat yellow and orange vegetables such as carrots, sweet potatoes, and winter squash.   Eat a variety of fruits every day.  Choose fresh or canned fruit (canned in its own juice or light syrup) instead of juice. Fruit juice has very little or no fiber.  Eat low-fat dairy foods.  Drink fat-free (skim) milk or 1% milk. Eat fat-free yogurt and low-fat cottage cheese. Try low-fat cheeses such as mozzarella and other reduced-fat cheeses.  Choose meat and other protein foods that are low in fat.  Choose beans or other legumes such as split peas or lentils. Choose fish, skinless poultry (chicken or turkey), or lean cuts of red meat (beef or pork). Before you cook meat or poultry, cut off any visible fat.   Use less fat and oil.  Try baking foods instead of frying them. Add less fat, such as margarine, sour cream, regular salad dressing and mayonnaise to foods. Eat fewer high-fat foods. Some examples of high-fat foods include french fries, doughnuts, ice cream, and cakes.  Eat fewer sweets.  Limit foods and drinks that are high in sugar. This includes candy, cookies, regular soda, and sweetened drinks.  Exercise:  Exercise at least 30 minutes per day on most days of the week. Some examples of exercise include walking, biking, dancing, and swimming. You can also fit in more physical activity by taking the stairs instead of the elevator or parking farther away from stores. Ask your healthcare provider about the best exercise plan for you.    © Copyright [employer] 2018 Information is for End User's use only and may not be sold, redistributed or otherwise used for commercial purposes. All illustrations and images included in CareNotes® are the  copyrighted property of [employer] or [employer]

## 2025-07-10 NOTE — ASSESSMENT & PLAN NOTE
Orders:    metoprolol succinate (TOPROL-XL) 25 mg 24 hr tablet; Take 1 tablet (25 mg total) by mouth daily    flecainide (TAMBOCOR) 50 mg tablet; Take 1 tablet (50 mg total) by mouth 2 (two) times a day

## 2025-07-21 ENCOUNTER — TELEPHONE (OUTPATIENT)
Age: 85
End: 2025-07-21

## 2025-07-21 NOTE — TELEPHONE ENCOUNTER
Caller: Emily Gama     Doctor: Dr. Mohit Martínez     Reason for call: Patient moving to [facility] and is seeking a recommendation for a new cardiologist     Call back#: [phone]

## 2025-07-30 DIAGNOSIS — E78.5 HYPERLIPIDEMIA, UNSPECIFIED HYPERLIPIDEMIA TYPE: ICD-10-CM

## 2025-07-31 RX ORDER — SIMVASTATIN 10 MG
10 TABLET ORAL
Qty: 90 TABLET | Refills: 3 | Status: SHIPPED | OUTPATIENT
Start: 2025-07-31